# Patient Record
Sex: MALE | Race: WHITE | Employment: FULL TIME | URBAN - METROPOLITAN AREA
[De-identification: names, ages, dates, MRNs, and addresses within clinical notes are randomized per-mention and may not be internally consistent; named-entity substitution may affect disease eponyms.]

---

## 2017-03-09 ENCOUNTER — OFFICE VISIT (OUTPATIENT)
Dept: INTERNAL MEDICINE CLINIC | Facility: CLINIC | Age: 57
End: 2017-03-09

## 2017-03-09 VITALS
HEIGHT: 74 IN | HEART RATE: 72 BPM | SYSTOLIC BLOOD PRESSURE: 148 MMHG | WEIGHT: 247 LBS | RESPIRATION RATE: 20 BRPM | BODY MASS INDEX: 31.7 KG/M2 | TEMPERATURE: 98 F | DIASTOLIC BLOOD PRESSURE: 84 MMHG

## 2017-03-09 DIAGNOSIS — F17.200 TOBACCO USE DISORDER: ICD-10-CM

## 2017-03-09 DIAGNOSIS — I10 ESSENTIAL HYPERTENSION WITH GOAL BLOOD PRESSURE LESS THAN 140/90: Primary | ICD-10-CM

## 2017-03-09 PROCEDURE — 99214 OFFICE O/P EST MOD 30 MIN: CPT | Performed by: INTERNAL MEDICINE

## 2017-03-09 PROCEDURE — 99212 OFFICE O/P EST SF 10 MIN: CPT | Performed by: INTERNAL MEDICINE

## 2017-03-09 RX ORDER — AMLODIPINE BESYLATE 2.5 MG/1
2.5 TABLET ORAL DAILY
Qty: 90 TABLET | Refills: 3 | Status: SHIPPED | OUTPATIENT
Start: 2017-03-09 | End: 2018-01-09

## 2017-03-09 RX ORDER — LOSARTAN POTASSIUM AND HYDROCHLOROTHIAZIDE 25; 100 MG/1; MG/1
1 TABLET ORAL DAILY
Qty: 90 TABLET | Refills: 3 | Status: SHIPPED | OUTPATIENT
Start: 2017-03-09 | End: 2018-04-14

## 2017-03-09 RX ORDER — VARENICLINE TARTRATE 1 MG/1
1 TABLET, FILM COATED ORAL 2 TIMES DAILY
Refills: 6 | COMMUNITY
Start: 2017-02-08 | End: 2017-12-12

## 2017-03-09 NOTE — PROGRESS NOTES
HPI:    Patient ID: Sammy Hammonds is a 64year old male. HPI  Patient is here for follow-up on hypertension. Last seen on December. At that time increase losartan hydrochlorothiazide 100/12.5 up to 100/25.   Also taking Chantix to try to quit smoking HENT: Negative for hearing loss. Eyes: Negative for visual disturbance. Respiratory: Negative for cough and shortness of breath. Cardiovascular: Negative for chest pain and palpitations.    Gastrointestinal: Negative for nausea, vomiting, abdomina Abdominal: Soft. Bowel sounds are normal. He exhibits no mass. There is no tenderness. Musculoskeletal: He exhibits no tenderness. Lymphadenopathy:     He has no cervical adenopathy. Neurological: He is alert. Skin: Skin is warm and dry.  No rash

## 2017-06-06 ENCOUNTER — TELEPHONE (OUTPATIENT)
Dept: INTERNAL MEDICINE CLINIC | Facility: CLINIC | Age: 57
End: 2017-06-06

## 2017-06-10 RX ORDER — CYCLOBENZAPRINE HCL 10 MG
TABLET ORAL
Qty: 30 TABLET | Refills: 1 | Status: SHIPPED | OUTPATIENT
Start: 2017-06-10 | End: 2017-12-12

## 2017-12-01 ENCOUNTER — OFFICE VISIT (OUTPATIENT)
Dept: ORTHOPEDICS CLINIC | Facility: CLINIC | Age: 57
End: 2017-12-01

## 2017-12-01 ENCOUNTER — HOSPITAL ENCOUNTER (OUTPATIENT)
Dept: GENERAL RADIOLOGY | Facility: HOSPITAL | Age: 57
Discharge: HOME | End: 2017-12-01
Attending: ORTHOPAEDIC SURGERY
Payer: COMMERCIAL

## 2017-12-01 DIAGNOSIS — M25.562 ACUTE PAIN OF LEFT KNEE: Primary | ICD-10-CM

## 2017-12-01 DIAGNOSIS — M25.562 LEFT KNEE PAIN, UNSPECIFIED CHRONICITY: ICD-10-CM

## 2017-12-01 DIAGNOSIS — M25.572 ACUTE LEFT ANKLE PAIN: ICD-10-CM

## 2017-12-01 DIAGNOSIS — M51.36 DDD (DEGENERATIVE DISC DISEASE), LUMBAR: ICD-10-CM

## 2017-12-01 PROCEDURE — 99214 OFFICE O/P EST MOD 30 MIN: CPT | Performed by: ORTHOPAEDIC SURGERY

## 2017-12-01 PROCEDURE — 73565 X-RAY EXAM OF KNEES: CPT | Performed by: ORTHOPAEDIC SURGERY

## 2017-12-01 PROCEDURE — 73560 X-RAY EXAM OF KNEE 1 OR 2: CPT | Performed by: ORTHOPAEDIC SURGERY

## 2017-12-01 PROCEDURE — 99212 OFFICE O/P EST SF 10 MIN: CPT | Performed by: ORTHOPAEDIC SURGERY

## 2017-12-01 NOTE — PROGRESS NOTES
12/1/2017  Maribeth Kam  3/22/1960  62year old   male  Brendon Condon MD    HPI:   Patient presents with:  Knee Pain: left- pt states on 11/10 he was walking and his knee gave out and has had pain since.      This is a pleasant 42-year-old male froylan patient has a soft calf. The patient has a negative Homans sign. Patient has range of motion from 0-130°. Patient has no palpable defect along the superior pole of the patella. There is no medial or lateral joint line tenderness to palpation.     The pa

## 2017-12-07 ENCOUNTER — OFFICE VISIT (OUTPATIENT)
Dept: PHYSICAL THERAPY | Age: 57
End: 2017-12-07
Attending: ORTHOPAEDIC SURGERY
Payer: COMMERCIAL

## 2017-12-07 PROCEDURE — 97162 PT EVAL MOD COMPLEX 30 MIN: CPT | Performed by: PHYSICAL THERAPIST

## 2017-12-07 NOTE — PROGRESS NOTES
LOWER EXTREMITY EVALUATION:   Referring Physician: Dr. Blu Gates  Date of Onset: November 10, 2017 Date of Service: 12/7/2017   Diagnosis: Acute onset of L knee pain, DDD lumbar spine  PATIENT SUMMARY:   Olga Zamora is a 62year old y/o male who present heel strike, push off L  Palpation: slight tenderness superior to patella   Sensation: Intact    AROM:  Hip Knee Foot/Ankle   Flexion: R 120; L 110  Extension: R 10; L 10  Abduction: R WNL; L WNL  ER: R WNL; L wnl  IR: R wnl; L  wnl Flexion: 130 5 L 115  E be seen for 2 x/week or a total of 8-12 visits over a 90 day period. Treatment will include:  Therapeutic Exercise and Home Exercise Program instruction    Education or treatment limitation: None  Rehab Potential: good    FOTO:       Patient  was advised of

## 2017-12-11 ENCOUNTER — OFFICE VISIT (OUTPATIENT)
Dept: PHYSICAL THERAPY | Age: 57
End: 2017-12-11
Attending: ORTHOPAEDIC SURGERY
Payer: COMMERCIAL

## 2017-12-11 PROCEDURE — 97110 THERAPEUTIC EXERCISES: CPT

## 2017-12-11 NOTE — PROGRESS NOTES
Dx: acute onset of (L) knee pain , DDD lumbar spine         Authorized # of Visits:  12 per POC         Next MD visit: none scheduled  Fall Risk: standard         Precautions: n/a

## 2017-12-12 ENCOUNTER — OFFICE VISIT (OUTPATIENT)
Dept: NEUROLOGY | Facility: CLINIC | Age: 57
End: 2017-12-12

## 2017-12-12 ENCOUNTER — TELEPHONE (OUTPATIENT)
Dept: NEUROLOGY | Facility: CLINIC | Age: 57
End: 2017-12-12

## 2017-12-12 VITALS
SYSTOLIC BLOOD PRESSURE: 126 MMHG | HEIGHT: 74 IN | DIASTOLIC BLOOD PRESSURE: 80 MMHG | WEIGHT: 250 LBS | BODY MASS INDEX: 32.08 KG/M2 | HEART RATE: 84 BPM | RESPIRATION RATE: 16 BRPM

## 2017-12-12 DIAGNOSIS — R25.1 TREMOR OF BOTH HANDS: ICD-10-CM

## 2017-12-12 DIAGNOSIS — M16.0 BILATERAL PRIMARY OSTEOARTHRITIS OF HIP: ICD-10-CM

## 2017-12-12 DIAGNOSIS — M47.816 LUMBAR FACET ARTHROPATHY: Primary | ICD-10-CM

## 2017-12-12 PROCEDURE — 99204 OFFICE O/P NEW MOD 45 MIN: CPT | Performed by: PHYSICAL MEDICINE & REHABILITATION

## 2017-12-12 NOTE — TELEPHONE ENCOUNTER
[employer] for authorization of approval for MRI L-spine wo. Approval was given with Authorization # [id number] effective 12/12/17 to 01/10/18. Will call Pt. to inform. Pt. informed of approval. Transferred call to scheduling for appt.

## 2017-12-12 NOTE — H&P
No referring provider defined for this encounter.   Orquidea Chandler MD    PHYSICAL MEDICINE and REHABILITATION NEW PATIENT    Chief Complaint: low back pain    HPI: Yulia Zurita is a 62year old male who presents with a chief complaint of Leg Pain (New p Previous treatments:  Physical therapy as above. Has been prescribed medrol dosepak in the past but he is unsure why. He has also taken percocet for post-op pain, but none recently for low back pain.  Has also taken diclofenac in the past.    Current Pain: Omega-3 Fatty Acids ( OMEGA-3 FISH OIL) 1200 MG Oral Capsule Delayed Release Take  by mouth. Disp:  Rfl:    B Complex Vitamins (B-COMPLEX/B-12 TR) Oral Tab CR Take  by mouth.  Disp:  Rfl:        Pollen                  Runny nose    Social History  Social Lumbar range of motion: Pain with terminal extension. No pain with lumbar flexion. Hip range of motion: + back pain with internal rotation of the bilateral hips. No pain with external rotation of the bilateral hip.   Palpation: + pain with palpation of th Imaging: Xray of the L-spine reviewed independently with patient. He has a bilateral L5-S1 lumbar facet hypertrophy, right worse than left. He also has disc space narrowing at L4-L5. X-ray of the bilateral hips is also reviewed.   He has medial joint spa

## 2017-12-14 ENCOUNTER — MED REC SCAN ONLY (OUTPATIENT)
Dept: NEUROLOGY | Facility: CLINIC | Age: 57
End: 2017-12-14

## 2017-12-14 ENCOUNTER — OFFICE VISIT (OUTPATIENT)
Dept: PHYSICAL THERAPY | Age: 57
End: 2017-12-14
Attending: ORTHOPAEDIC SURGERY
Payer: COMMERCIAL

## 2017-12-14 PROCEDURE — 97110 THERAPEUTIC EXERCISES: CPT

## 2017-12-14 NOTE — PROGRESS NOTES
Dx: acute onset of (L) knee pain , DDD lumbar spine         Authorized # of Visits:  12 per POC         Next MD visit: none scheduled  Fall Risk: standard         Precautions: n/a Timed Treatment: 40 min  Total Treatment Time: 45 min

## 2017-12-19 ENCOUNTER — OFFICE VISIT (OUTPATIENT)
Dept: PHYSICAL THERAPY | Age: 57
End: 2017-12-19
Attending: ORTHOPAEDIC SURGERY
Payer: COMMERCIAL

## 2017-12-19 PROCEDURE — 97110 THERAPEUTIC EXERCISES: CPT

## 2017-12-19 NOTE — PROGRESS NOTES
Dx: acute onset of (L) knee pain , DDD lumbar spine         Authorized # of Visits:  12 per POC         Next MD visit: none scheduled  Fall Risk: standard         Precautions: n/a Education done: reviewed current  HEP with patient . ( seated QS x 20 , supine SLR with QS  X 20 , sdly : hip abduction x 20 , prone : hip extension x 20 with 2 lbs to be performed 2 times a day . Plan: continue per POC and advance as able.     Yecenia

## 2017-12-20 ENCOUNTER — HOSPITAL ENCOUNTER (OUTPATIENT)
Dept: MRI IMAGING | Facility: HOSPITAL | Age: 57
Discharge: HOME | End: 2017-12-20
Attending: PHYSICAL MEDICINE & REHABILITATION
Payer: COMMERCIAL

## 2017-12-20 DIAGNOSIS — M47.816 LUMBAR FACET ARTHROPATHY: ICD-10-CM

## 2017-12-20 PROCEDURE — 72148 MRI LUMBAR SPINE W/O DYE: CPT | Performed by: PHYSICAL MEDICINE & REHABILITATION

## 2017-12-20 NOTE — PROGRESS NOTES
Please let the patient know he has a mild aortic aneurysm that he should discuss with his primary care physician. The aneurysm is not particularly large but is something that should be noted and monitored if the PCP thinks this is appropriate.  In terms of

## 2017-12-21 ENCOUNTER — OFFICE VISIT (OUTPATIENT)
Dept: PHYSICAL THERAPY | Age: 57
End: 2017-12-21
Attending: ORTHOPAEDIC SURGERY
Payer: COMMERCIAL

## 2017-12-21 ENCOUNTER — TELEPHONE (OUTPATIENT)
Dept: NEUROLOGY | Facility: CLINIC | Age: 57
End: 2017-12-21

## 2017-12-21 PROCEDURE — 97110 THERAPEUTIC EXERCISES: CPT

## 2017-12-21 NOTE — PROGRESS NOTES
Dx: acute onset of (L) knee pain , DDD lumbar spine         Authorized # of Visits:  12 per POC         Next MD visit: none scheduled  Fall Risk: standard         Precautions: n/a as able.     Charges: ex's 3       Total Timed Treatment: 40 min  Total Treatment Time: 45 min

## 2017-12-21 NOTE — TELEPHONE ENCOUNTER
----- Message from Lula Yip DO sent at 12/20/2017  4:07 PM CST -----  Please let the patient know he has a mild aortic aneurysm that he should discuss with his primary care physician.  The aneurysm is not particularly large but is something that shoul

## 2017-12-21 NOTE — TELEPHONE ENCOUNTER
----- Message from Argentina Bryan DO sent at 12/20/2017  4:07 PM CST -----  Please let the patient know he has a mild aortic aneurysm that he should discuss with his primary care physician.  The aneurysm is not particularly large but is something that shoul

## 2017-12-22 NOTE — TELEPHONE ENCOUNTER
Advised patient of MRI results and Dr Contreras Form recommendations from 12-20. Patient will f/u with his primary regarding the aneurysm and back. Patient was advised to continue with Physical Therapy.

## 2017-12-22 NOTE — TELEPHONE ENCOUNTER
[address] [facility], left voice mail to have patient call today for MRI result. Patient stated he would call yesterday but never called.

## 2017-12-26 ENCOUNTER — APPOINTMENT (OUTPATIENT)
Dept: PHYSICAL THERAPY | Age: 57
End: 2017-12-26
Attending: ORTHOPAEDIC SURGERY
Payer: COMMERCIAL

## 2017-12-28 ENCOUNTER — OFFICE VISIT (OUTPATIENT)
Dept: PHYSICAL THERAPY | Age: 57
End: 2017-12-28
Attending: ORTHOPAEDIC SURGERY
Payer: COMMERCIAL

## 2017-12-28 NOTE — PROGRESS NOTES
Dx: acute onset of (L) knee pain , DDD lumbar spine         Authorized # of Visits:  12 per POC         Next MD visit: none scheduled  Fall Risk: standard         Precautions: n/a min  Total Treatment Time: 0 min

## 2018-01-02 ENCOUNTER — TELEPHONE (OUTPATIENT)
Dept: OTHER | Age: 58
End: 2018-01-02

## 2018-01-02 ENCOUNTER — APPOINTMENT (OUTPATIENT)
Dept: PHYSICAL THERAPY | Age: 58
End: 2018-01-02
Attending: ORTHOPAEDIC SURGERY
Payer: COMMERCIAL

## 2018-01-02 ENCOUNTER — OFFICE VISIT (OUTPATIENT)
Dept: ORTHOPEDICS CLINIC | Facility: CLINIC | Age: 58
End: 2018-01-02

## 2018-01-02 DIAGNOSIS — M51.36 DDD (DEGENERATIVE DISC DISEASE), LUMBAR: ICD-10-CM

## 2018-01-02 DIAGNOSIS — M25.562 ACUTE PAIN OF LEFT KNEE: Primary | ICD-10-CM

## 2018-01-02 PROCEDURE — 99213 OFFICE O/P EST LOW 20 MIN: CPT | Performed by: ORTHOPAEDIC SURGERY

## 2018-01-02 PROCEDURE — 99212 OFFICE O/P EST SF 10 MIN: CPT | Performed by: ORTHOPAEDIC SURGERY

## 2018-01-02 NOTE — TELEPHONE ENCOUNTER
Patient received MRI results from 12/20/17 and was told that he has an aortic aneurysm and to follow up with his PCP as soon as possible to discuss (please see MRI result note 12/20/17)     No availability this week    Please respond to pool: [facility]

## 2018-01-03 NOTE — PROGRESS NOTES
1/2/2018  Sandre Hunt  3/22/1960  62year old   male  Willis Maier MD    HPI:   Patient presents with:  Knee Pain: left- pt states he was told to stop PT due an aneurysm seen on MRI lumbar spine. pt has f/u appt w/ pcp this wk.      This is a ple found on his most recent MRI of the lumbar spine. The patient will be following up with his primary care physician for this issue. Until he has medical clearance, he will hold off on performing physical therapy.   The patient will follow-up after his phys

## 2018-01-04 ENCOUNTER — APPOINTMENT (OUTPATIENT)
Dept: PHYSICAL THERAPY | Age: 58
End: 2018-01-04
Attending: ORTHOPAEDIC SURGERY
Payer: COMMERCIAL

## 2018-01-08 ENCOUNTER — APPOINTMENT (OUTPATIENT)
Dept: PHYSICAL THERAPY | Age: 58
End: 2018-01-08
Attending: ORTHOPAEDIC SURGERY
Payer: COMMERCIAL

## 2018-01-09 ENCOUNTER — OFFICE VISIT (OUTPATIENT)
Dept: INTERNAL MEDICINE CLINIC | Facility: CLINIC | Age: 58
End: 2018-01-09

## 2018-01-09 VITALS
DIASTOLIC BLOOD PRESSURE: 84 MMHG | HEART RATE: 76 BPM | HEIGHT: 74 IN | TEMPERATURE: 98 F | SYSTOLIC BLOOD PRESSURE: 138 MMHG | WEIGHT: 252.81 LBS | BODY MASS INDEX: 32.44 KG/M2 | RESPIRATION RATE: 20 BRPM

## 2018-01-09 DIAGNOSIS — M51.9 LUMBAR DISC DISEASE: ICD-10-CM

## 2018-01-09 DIAGNOSIS — I71.4 ABDOMINAL AORTIC ANEURYSM (AAA) WITHOUT RUPTURE (HCC): ICD-10-CM

## 2018-01-09 DIAGNOSIS — I10 ESSENTIAL HYPERTENSION WITH GOAL BLOOD PRESSURE LESS THAN 140/90: Primary | ICD-10-CM

## 2018-01-09 PROCEDURE — 99212 OFFICE O/P EST SF 10 MIN: CPT | Performed by: INTERNAL MEDICINE

## 2018-01-09 PROCEDURE — 99214 OFFICE O/P EST MOD 30 MIN: CPT | Performed by: INTERNAL MEDICINE

## 2018-01-09 RX ORDER — AMLODIPINE BESYLATE 5 MG/1
5 TABLET ORAL DAILY
Qty: 90 TABLET | Refills: 3 | Status: SHIPPED | OUTPATIENT
Start: 2018-01-09 | End: 2018-09-11

## 2018-01-09 NOTE — PROGRESS NOTES
HPI:    Patient ID: Jimmye Homans is a 62year old male. HPI  Patient is here for follow-up on hypertension as well as lower back issues. Last seen here on March 9. At that time we added amlodipine 2.5 mg a day.   Continued other blood pressure medic Years: 40.00        Types: Cigarettes  Smokeless tobacco: Never Used                      Comment: taking chantix  Alcohol use: Yes           3.6 oz/week     Cans of beer: 6 per week     Comment: beer - socially (1 drink) only weekend         Review of Sys Cardiovascular: Normal rate, regular rhythm, normal heart sounds and intact distal pulses. Exam reveals no gallop and no friction rub. No murmur heard. Edema not present. Pulmonary/Chest: Effort normal and breath sounds normal. He has no wheezes.

## 2018-01-11 ENCOUNTER — APPOINTMENT (OUTPATIENT)
Dept: PHYSICAL THERAPY | Age: 58
End: 2018-01-11
Attending: ORTHOPAEDIC SURGERY
Payer: COMMERCIAL

## 2018-01-26 ENCOUNTER — TELEPHONE (OUTPATIENT)
Dept: NEUROLOGY | Facility: CLINIC | Age: 58
End: 2018-01-26

## 2018-01-26 ENCOUNTER — OFFICE VISIT (OUTPATIENT)
Dept: NEUROLOGY | Facility: CLINIC | Age: 58
End: 2018-01-26

## 2018-01-26 VITALS
HEIGHT: 74 IN | BODY MASS INDEX: 32.73 KG/M2 | SYSTOLIC BLOOD PRESSURE: 142 MMHG | HEART RATE: 80 BPM | DIASTOLIC BLOOD PRESSURE: 82 MMHG | RESPIRATION RATE: 16 BRPM | WEIGHT: 255 LBS

## 2018-01-26 DIAGNOSIS — M48.061 SPINAL STENOSIS OF LUMBAR REGION WITHOUT NEUROGENIC CLAUDICATION: ICD-10-CM

## 2018-01-26 DIAGNOSIS — M16.0 BILATERAL PRIMARY OSTEOARTHRITIS OF HIP: ICD-10-CM

## 2018-01-26 DIAGNOSIS — M47.816 LUMBAR FACET ARTHROPATHY: Primary | ICD-10-CM

## 2018-01-26 PROCEDURE — 99213 OFFICE O/P EST LOW 20 MIN: CPT | Performed by: PHYSICAL MEDICINE & REHABILITATION

## 2018-01-26 NOTE — TELEPHONE ENCOUNTER
Patient has been scheduled for a L4-5 interlaminar epidural steroid injection, midline, under fluoroscopy, local on 2/14/18 at the [facility]. Medications and allergies reviewed.  Patient informed to hold aspirins, nsaids, blood thinners, vitamins and fish oils 7

## 2018-01-26 NOTE — TELEPHONE ENCOUNTER
Called [facility] at [phone] for authorization of approval for Lr-5 interlaminar epidural steroid injection, midline, under fluoroscopy , CPT code 41987. T/Hyun POLANCO who states no authorization is required. Reference #[id number].   Will inform nursing to leah

## 2018-01-26 NOTE — PROGRESS NOTES
HPI:    Patient ID: Dee Diaz is a 62year old male. This is a 63-year-old male who returns for follow-up to discuss results of lumbar MRI.   It did note an incidental 3.8 x 3.4 infrarenal aortic mass which is being followed by his primary care phy Normocephalic and atraumatic. Eyes: Conjunctivae and EOM are normal.   Musculoskeletal:   Lumbar range of motion: Positive pain with lumbar flexion. Less pain with lumbar extension.     Palpation: Tenderness to palpation at the bilateral L5 paravertebral DO  Physical Medicine and [address] [place] [address]

## 2018-01-26 NOTE — PATIENT INSTRUCTIONS
As of October 6th 2014, the Drug Enforcement Agency [facility]) is reclassifying all hydrocodone combination medications from Schedule III to Schedule II. This includes medications such as Norco, Vicodin, Lortab, Zohydro, and Vicoprofen.     What this means for y

## 2018-02-14 ENCOUNTER — OFFICE VISIT (OUTPATIENT)
Dept: SURGERY | Facility: CLINIC | Age: 58
End: 2018-02-14

## 2018-02-14 DIAGNOSIS — M48.061 SPINAL STENOSIS OF LUMBAR REGION WITHOUT NEUROGENIC CLAUDICATION: Primary | ICD-10-CM

## 2018-02-14 PROCEDURE — 62323 NJX INTERLAMINAR LMBR/SAC: CPT | Performed by: PHYSICAL MEDICINE & REHABILITATION

## 2018-02-14 NOTE — PROCEDURES
Procedure Note                Informed Consent Obtained by:  Griselda Thomson DO  Procedure performed by: Griselda Thomson DO    Procedure: LUMBAR EPIDURAL STEROID INJECTION UNDER FLUOROSCOPY OF L4-5 midline    Pre-operative Diagnosis: Lumbar Radiculitis, lumbar Patient may continue taking his/her medication as prescribed by the patient’s physician.   4) F/U in 2-3 weeks for post-procedure evaluation    Carlitos Mcmahan DO  Physical Medicine and [address] [place] [address]

## 2018-03-19 ENCOUNTER — CHARTING TRANS (OUTPATIENT)
Dept: OTHER | Age: 58
End: 2018-03-19

## 2018-04-05 ENCOUNTER — OFFICE VISIT (OUTPATIENT)
Dept: NEUROLOGY | Facility: CLINIC | Age: 58
End: 2018-04-05

## 2018-04-05 VITALS
RESPIRATION RATE: 16 BRPM | SYSTOLIC BLOOD PRESSURE: 136 MMHG | HEIGHT: 74 IN | BODY MASS INDEX: 32.73 KG/M2 | WEIGHT: 255 LBS | DIASTOLIC BLOOD PRESSURE: 84 MMHG | HEART RATE: 68 BPM

## 2018-04-05 DIAGNOSIS — M48.061 LUMBAR STENOSIS WITHOUT NEUROGENIC CLAUDICATION: Primary | ICD-10-CM

## 2018-04-05 DIAGNOSIS — M48.061 SPINAL STENOSIS OF LUMBAR REGION WITHOUT NEUROGENIC CLAUDICATION: ICD-10-CM

## 2018-04-05 DIAGNOSIS — M47.816 LUMBAR FACET ARTHROPATHY: ICD-10-CM

## 2018-04-05 PROCEDURE — 99213 OFFICE O/P EST LOW 20 MIN: CPT | Performed by: PHYSICAL MEDICINE & REHABILITATION

## 2018-04-05 NOTE — PATIENT INSTRUCTIONS
May continue low dose aspirin for procedure. As of October 6th 2014, the Drug Enforcement Agency [facility]) is reclassifying all hydrocodone combination medications from Schedule III to Schedule II.  This includes medications such as Norco, Vicodin, Lortab, prescription must be documented in your chart.

## 2018-04-05 NOTE — PROGRESS NOTES
HPI:    Patient ID: Kristine Cole is a 62year old male. Since a 26-year-old male who presents for follow-up. I performed a L4-L5 interlaminar lumbar epidural steroid injection on 2/14/2018. States that the injection gave him about 20% relief.   Ryan Danielle tests: Negative supine SLR bilaterally. Negative seated slump test. Decreased flexibility of the left hamstrings. Neurological:   Strength: Decreased muscle bulk in the left thigh. 5/5 LE strength b/l.   Sensation: intact to light touch bilaterally   Nurs

## 2018-04-09 ENCOUNTER — TELEPHONE (OUTPATIENT)
Dept: NEUROLOGY | Facility: CLINIC | Age: 58
End: 2018-04-09

## 2018-04-09 NOTE — TELEPHONE ENCOUNTER
LMTCB. Scheduled patient for bilateral L4-5 transforaminal epidural steroid injection under fluoroscopy on 5/7/18 per Dr. Blanca Yousif note. Asked him to call the office to confirm date and review pre-procedure injections.

## 2018-04-09 NOTE — TELEPHONE ENCOUNTER
Called [facility] BS for authorization of approval of bilateral L4-5 TFESIs cpt codes [id number], [id number]. Talked to   Senait. who states no authorization is required. Reference # [id number]. Will inform Nursing.

## 2018-04-11 ENCOUNTER — HOSPITAL ENCOUNTER (OUTPATIENT)
Dept: ULTRASOUND IMAGING | Age: 58
Discharge: HOME | End: 2018-04-11
Attending: INTERNAL MEDICINE
Payer: COMMERCIAL

## 2018-04-11 DIAGNOSIS — I71.4 ABDOMINAL AORTIC ANEURYSM (AAA) WITHOUT RUPTURE (HCC): ICD-10-CM

## 2018-04-11 PROCEDURE — 76770 US EXAM ABDO BACK WALL COMP: CPT | Performed by: INTERNAL MEDICINE

## 2018-04-16 RX ORDER — LOSARTAN POTASSIUM AND HYDROCHLOROTHIAZIDE 25; 100 MG/1; MG/1
TABLET ORAL
Qty: 90 TABLET | Refills: 1 | Status: SHIPPED | OUTPATIENT
Start: 2018-04-16 | End: 2018-11-08

## 2018-04-16 NOTE — TELEPHONE ENCOUNTER
Hypertensive Medications  Protocol Criteria:  · Appointment scheduled in the past 6 months or in the next 3 months  · BMP or CMP in the past 12 months  · Creatinine result < 2  Recent Outpatient Visits            1 week ago Lumbar stenosis without neurogen

## 2018-05-03 NOTE — TELEPHONE ENCOUNTER
Called patient, confirmed he is doing the injection 5/7/18, stated he already stopped medications he was instructed to stop. Faxed form to [facility].      Patient has been scheduled for a Bilateral L4-5 transforaminal epidural steroid injection under fluoroscopy

## 2018-05-03 NOTE — TELEPHONE ENCOUNTER
Sandor Purcell from [facility] called to confirm patients for 5/7/18, called patient to confirm if he is still having the injection.

## 2018-05-07 ENCOUNTER — OFFICE VISIT (OUTPATIENT)
Dept: SURGERY | Facility: CLINIC | Age: 58
End: 2018-05-07

## 2018-05-07 DIAGNOSIS — M48.061 SPINAL STENOSIS OF LUMBAR REGION WITHOUT NEUROGENIC CLAUDICATION: Primary | ICD-10-CM

## 2018-05-07 PROCEDURE — 64483 NJX AA&/STRD TFRM EPI L/S 1: CPT | Performed by: PHYSICAL MEDICINE & REHABILITATION

## 2018-05-07 NOTE — PROCEDURES
[address]    BILATERAL LUMBAR TRANSFORAMINAL   NAME:  Kristine Cole    MR #:    [id number] :  3/22/1960     PHYSICIAN:  Maria Dolores Abraham        Operative Report    DATE OF PROCEDURE: 2018   PREOPERATIVE DIAGNOSES: 1.  L3-4 jenae of 1% PF lidocaine without epinephrine. Then, a 5 inch, 22-gauge spinal needle was inserted and directed towards the left L4-5 intervertebral foramen.   When it felt to be in good position, AP fluoroscopy was used to advance the needle to the 6 o'clock pos

## 2018-07-16 ENCOUNTER — OFFICE VISIT (OUTPATIENT)
Dept: NEUROLOGY | Facility: CLINIC | Age: 58
End: 2018-07-16

## 2018-07-16 VITALS
HEART RATE: 72 BPM | DIASTOLIC BLOOD PRESSURE: 78 MMHG | BODY MASS INDEX: 32.34 KG/M2 | HEIGHT: 74 IN | SYSTOLIC BLOOD PRESSURE: 140 MMHG | WEIGHT: 252 LBS

## 2018-07-16 DIAGNOSIS — M48.061 SPINAL STENOSIS OF LUMBAR REGION WITHOUT NEUROGENIC CLAUDICATION: Primary | ICD-10-CM

## 2018-07-16 DIAGNOSIS — M16.0 BILATERAL PRIMARY OSTEOARTHRITIS OF HIP: ICD-10-CM

## 2018-07-16 DIAGNOSIS — M47.816 LUMBAR FACET ARTHROPATHY: ICD-10-CM

## 2018-07-16 PROCEDURE — 99213 OFFICE O/P EST LOW 20 MIN: CPT | Performed by: PHYSICAL MEDICINE & REHABILITATION

## 2018-07-16 NOTE — PROGRESS NOTES
HPI:    Patient ID: Dee Diaz is a 62year old male. He is here for follow-up of bilateral axial low back pain. He has undergone the following procedures with me:    1.   L4-L5 interlaminar epidural steroid injection under local anesthesia, 2/14 Tab CR Take  by mouth. Disp:  Rfl:      Allergies:  Pollen                  Runny nose   PHYSICAL EXAM:   Physical Exam   Constitutional: He appears well-developed and well-nourished. HENT:   Head: Normocephalic and atraumatic.    Eyes: Conjunctivae and E

## 2018-08-23 ENCOUNTER — CHARTING TRANS (OUTPATIENT)
Dept: OTHER | Age: 58
End: 2018-08-23

## 2018-09-11 ENCOUNTER — OFFICE VISIT (OUTPATIENT)
Dept: INTERNAL MEDICINE CLINIC | Facility: CLINIC | Age: 58
End: 2018-09-11
Payer: COMMERCIAL

## 2018-09-11 VITALS
RESPIRATION RATE: 20 BRPM | SYSTOLIC BLOOD PRESSURE: 138 MMHG | HEART RATE: 68 BPM | WEIGHT: 253.81 LBS | BODY MASS INDEX: 32.57 KG/M2 | HEIGHT: 74 IN | TEMPERATURE: 98 F | DIASTOLIC BLOOD PRESSURE: 86 MMHG

## 2018-09-11 DIAGNOSIS — J06.9 UPPER RESPIRATORY TRACT INFECTION, UNSPECIFIED TYPE: ICD-10-CM

## 2018-09-11 DIAGNOSIS — Z12.11 COLON CANCER SCREENING: ICD-10-CM

## 2018-09-11 DIAGNOSIS — F17.200 TOBACCO USE DISORDER: ICD-10-CM

## 2018-09-11 DIAGNOSIS — Z00.00 ROUTINE PHYSICAL EXAMINATION: Primary | ICD-10-CM

## 2018-09-11 DIAGNOSIS — M51.9 LUMBAR DISC DISEASE: ICD-10-CM

## 2018-09-11 DIAGNOSIS — I10 ESSENTIAL HYPERTENSION WITH GOAL BLOOD PRESSURE LESS THAN 140/90: ICD-10-CM

## 2018-09-11 DIAGNOSIS — I71.4 ABDOMINAL AORTIC ANEURYSM (AAA) WITHOUT RUPTURE (HCC): ICD-10-CM

## 2018-09-11 PROCEDURE — 99396 PREV VISIT EST AGE 40-64: CPT | Performed by: INTERNAL MEDICINE

## 2018-09-11 RX ORDER — AMLODIPINE BESYLATE 5 MG/1
5 TABLET ORAL DAILY
Qty: 90 TABLET | Refills: 3 | Status: SHIPPED | OUTPATIENT
Start: 2018-09-11 | End: 2019-06-25

## 2018-09-11 NOTE — PROGRESS NOTES
HPI:    Patient ID: Nora Johnson is a 62year old male. HPI  Patient is here requesting a physical exam.  I last saw him in March 2017. Since that time he has had multiple visits with orthopedics, physiatry, and physical therapy.   This is generally allergies   • H/O blood clots    • L3-4 central and bilateral foraminal stenosis 1/26/2018   • Lumbar facet arthropathy ([facility] [address] 12/12/2017      Past Surgical History:  2013: KNEE ARTHROSCOPY;  Right      Comment:  partial lateral meniscectomy  04/06/2015: MANDY Take  by mouth. Disp:  Rfl:    Multiple Vitamin (MULTI-VITAMINS) Oral Tab Take  by mouth. Disp:  Rfl:    Omega-3 Fatty Acids (KP OMEGA-3 FISH OIL) 1200 MG Oral Capsule Delayed Release Take  by mouth.  Disp:  Rfl:    B Complex Vitamins (B-COMPLEX/B-12 TR) Or REFLEX TO FREE T4, PSA SCREEN         Physical exam is unremarkable. Active issues as below. Health maintenance issues reviewed. We will check fasting blood work and contact patient with results. Encouraged diet and exercise as tolerated.   Try to lose

## 2018-10-27 ENCOUNTER — LAB ENCOUNTER (OUTPATIENT)
Dept: LAB | Age: 58
End: 2018-10-27
Attending: INTERNAL MEDICINE
Payer: COMMERCIAL

## 2018-10-27 DIAGNOSIS — Z00.00 ROUTINE PHYSICAL EXAMINATION: ICD-10-CM

## 2018-10-27 PROCEDURE — 80061 LIPID PANEL: CPT

## 2018-10-27 PROCEDURE — 82607 VITAMIN B-12: CPT

## 2018-10-27 PROCEDURE — 84439 ASSAY OF FREE THYROXINE: CPT

## 2018-10-27 PROCEDURE — 84443 ASSAY THYROID STIM HORMONE: CPT

## 2018-10-27 PROCEDURE — 85025 COMPLETE CBC W/AUTO DIFF WBC: CPT

## 2018-10-27 PROCEDURE — 36415 COLL VENOUS BLD VENIPUNCTURE: CPT

## 2018-10-27 PROCEDURE — 80053 COMPREHEN METABOLIC PANEL: CPT

## 2018-11-01 VITALS
OXYGEN SATURATION: 98 % | HEIGHT: 74 IN | BODY MASS INDEX: 32.73 KG/M2 | TEMPERATURE: 98.6 F | HEART RATE: 80 BPM | RESPIRATION RATE: 16 BRPM | WEIGHT: 255 LBS

## 2018-11-08 RX ORDER — LOSARTAN POTASSIUM AND HYDROCHLOROTHIAZIDE 25; 100 MG/1; MG/1
TABLET ORAL
Qty: 90 TABLET | Refills: 0 | Status: SHIPPED | OUTPATIENT
Start: 2018-11-08 | End: 2019-03-01

## 2018-11-27 VITALS
BODY MASS INDEX: 32.73 KG/M2 | HEIGHT: 74 IN | OXYGEN SATURATION: 98 % | WEIGHT: 255 LBS | RESPIRATION RATE: 20 BRPM | HEART RATE: 84 BPM | TEMPERATURE: 98.5 F

## 2018-12-12 ENCOUNTER — TELEPHONE (OUTPATIENT)
Dept: SCHEDULING | Age: 58
End: 2018-12-12

## 2018-12-12 ENCOUNTER — WALK IN (OUTPATIENT)
Dept: URGENT CARE | Age: 58
End: 2018-12-12

## 2018-12-12 VITALS
OXYGEN SATURATION: 98 % | SYSTOLIC BLOOD PRESSURE: 148 MMHG | RESPIRATION RATE: 16 BRPM | TEMPERATURE: 98.6 F | HEART RATE: 72 BPM | DIASTOLIC BLOOD PRESSURE: 88 MMHG

## 2018-12-12 DIAGNOSIS — J01.00 ACUTE NON-RECURRENT MAXILLARY SINUSITIS: Primary | ICD-10-CM

## 2018-12-12 DIAGNOSIS — I10 ESSENTIAL HYPERTENSION: ICD-10-CM

## 2018-12-12 PROCEDURE — 3079F DIAST BP 80-89 MM HG: CPT | Performed by: NURSE PRACTITIONER

## 2018-12-12 PROCEDURE — 99203 OFFICE O/P NEW LOW 30 MIN: CPT | Performed by: NURSE PRACTITIONER

## 2018-12-12 PROCEDURE — 3077F SYST BP >= 140 MM HG: CPT | Performed by: NURSE PRACTITIONER

## 2018-12-12 RX ORDER — AMLODIPINE BESYLATE 5 MG/1
5 TABLET ORAL DAILY
COMMUNITY

## 2018-12-12 RX ORDER — AMOXICILLIN AND CLAVULANATE POTASSIUM 875; 125 MG/1; MG/1
1 TABLET, FILM COATED ORAL EVERY 12 HOURS
Qty: 20 TABLET | Refills: 0 | Status: SHIPPED | OUTPATIENT
Start: 2018-12-12 | End: 2018-12-22

## 2018-12-12 RX ORDER — LOSARTAN POTASSIUM 25 MG/1
25 TABLET ORAL DAILY
COMMUNITY

## 2018-12-12 ASSESSMENT — ENCOUNTER SYMPTOMS
NEUROLOGICAL NEGATIVE: 1
ALLERGIC/IMMUNOLOGIC NEGATIVE: 1
WHEEZING: 0
COUGH: 1
SORE THROAT: 1
CHOKING: 0
FATIGUE: 1
GASTROINTESTINAL NEGATIVE: 1
FEVER: 0
PSYCHIATRIC NEGATIVE: 1
EYES NEGATIVE: 1
SHORTNESS OF BREATH: 0
SINUS PRESSURE: 1
APPETITE CHANGE: 0
CHEST TIGHTNESS: 0

## 2019-03-01 RX ORDER — LOSARTAN POTASSIUM AND HYDROCHLOROTHIAZIDE 25; 100 MG/1; MG/1
TABLET ORAL
Qty: 90 TABLET | Refills: 0 | Status: SHIPPED | OUTPATIENT
Start: 2019-03-01 | End: 2019-06-25

## 2019-04-25 ENCOUNTER — TELEPHONE (OUTPATIENT)
Dept: INTERNAL MEDICINE CLINIC | Facility: CLINIC | Age: 59
End: 2019-04-25

## 2019-04-25 DIAGNOSIS — I71.4 ABDOMINAL AORTIC ANEURYSM (AAA) WITHOUT RUPTURE (HCC): Primary | ICD-10-CM

## 2019-04-26 NOTE — TELEPHONE ENCOUNTER
Left message for patient to call office back, please transfer call to ext. [phone] when he calls office back.

## 2019-05-18 ENCOUNTER — HOSPITAL ENCOUNTER (OUTPATIENT)
Dept: ULTRASOUND IMAGING | Facility: HOSPITAL | Age: 59
Discharge: HOME | End: 2019-05-18
Attending: INTERNAL MEDICINE
Payer: COMMERCIAL

## 2019-05-18 DIAGNOSIS — I71.4 ABDOMINAL AORTIC ANEURYSM (AAA) WITHOUT RUPTURE (HCC): ICD-10-CM

## 2019-05-18 PROCEDURE — 76770 US EXAM ABDO BACK WALL COMP: CPT | Performed by: INTERNAL MEDICINE

## 2019-06-27 RX ORDER — LOSARTAN POTASSIUM AND HYDROCHLOROTHIAZIDE 25; 100 MG/1; MG/1
TABLET ORAL
Qty: 90 TABLET | Refills: 0 | Status: SHIPPED | OUTPATIENT
Start: 2019-06-27 | End: 2019-10-25

## 2019-06-27 RX ORDER — AMLODIPINE BESYLATE 5 MG/1
TABLET ORAL
Qty: 90 TABLET | Refills: 0 | Status: SHIPPED | OUTPATIENT
Start: 2019-06-27 | End: 2021-05-12

## 2019-10-29 NOTE — TELEPHONE ENCOUNTER
Please review; protocol failed.   Last office visit on 9/11/18    Hypertensive Medications  Protocol Criteria:  · Appointment scheduled in the past 6 months or in the next 3 months  · BMP or CMP in the past 12 months  · Creatinine result < 2  Recent Outpati

## 2019-10-30 RX ORDER — LOSARTAN POTASSIUM AND HYDROCHLOROTHIAZIDE 25; 100 MG/1; MG/1
TABLET ORAL
Qty: 90 TABLET | Refills: 1 | Status: SHIPPED | OUTPATIENT
Start: 2019-10-30 | End: 2020-06-12

## 2019-11-04 ENCOUNTER — OFFICE VISIT (OUTPATIENT)
Dept: INTERNAL MEDICINE CLINIC | Facility: CLINIC | Age: 59
End: 2019-11-04
Payer: COMMERCIAL

## 2019-11-04 VITALS
WEIGHT: 240 LBS | RESPIRATION RATE: 20 BRPM | SYSTOLIC BLOOD PRESSURE: 136 MMHG | HEIGHT: 74 IN | HEART RATE: 72 BPM | TEMPERATURE: 98 F | DIASTOLIC BLOOD PRESSURE: 82 MMHG | BODY MASS INDEX: 30.8 KG/M2

## 2019-11-04 DIAGNOSIS — Z12.11 COLON CANCER SCREENING: ICD-10-CM

## 2019-11-04 DIAGNOSIS — I71.4 ABDOMINAL AORTIC ANEURYSM (AAA) WITHOUT RUPTURE (HCC): ICD-10-CM

## 2019-11-04 DIAGNOSIS — M51.9 LUMBAR DISC DISEASE: ICD-10-CM

## 2019-11-04 DIAGNOSIS — Z00.00 ROUTINE PHYSICAL EXAMINATION: Primary | ICD-10-CM

## 2019-11-04 DIAGNOSIS — I10 ESSENTIAL HYPERTENSION WITH GOAL BLOOD PRESSURE LESS THAN 140/90: ICD-10-CM

## 2019-11-04 DIAGNOSIS — F17.200 TOBACCO USE DISORDER: ICD-10-CM

## 2019-11-04 PROCEDURE — 99396 PREV VISIT EST AGE 40-64: CPT | Performed by: INTERNAL MEDICINE

## 2019-11-04 RX ORDER — LOSARTAN POTASSIUM 25 MG/1
25 TABLET ORAL
COMMUNITY
End: 2019-11-04

## 2019-11-04 RX ORDER — MELOXICAM 15 MG/1
15 TABLET ORAL DAILY
Qty: 30 TABLET | Refills: 5 | Status: SHIPPED | OUTPATIENT
Start: 2019-11-04 | End: 2021-04-04

## 2019-11-04 RX ORDER — COMPRESS.STOCKING,KNEE,REG,LRG
EACH MISCELLANEOUS
Refills: 99 | COMMUNITY
Start: 2019-10-31

## 2019-11-04 RX ORDER — TRAMADOL HYDROCHLORIDE 50 MG/1
50 TABLET ORAL
COMMUNITY
Start: 2019-03-06 | End: 2019-11-04 | Stop reason: ALTCHOICE

## 2019-11-04 NOTE — PROGRESS NOTES
HPI:    Patient ID: Gianni Braga is a 61year old male. HPI  Patient is here requesting physical exam and follow-up on chronic medical issues as listed below. Last seen here September of last year.   Full blood work at that time showed worsening chol 12/8/2016   • Extrinsic asthma, unspecified     triggered by enviromental allergies   • H/O blood clots    • L3-4 central and bilateral foraminal stenosis 1/26/2018   • Lumbar facet arthropathy 12/12/2017      Past Surgical History:   Procedure Laterality MOUTH DAILY 90 tablet 1   • AMLODIPINE BESYLATE 5 MG Oral Tab TAKE 1 TABLET BY MOUTH DAILY 90 tablet 0   • Ascorbic Acid (VITAMIN C) 250 MG Oral Tab Take 250 mg by mouth daily. • Misc Natural Products (GLUCOSAMINE CHOND COMPLEX/MSM OR) Take  by mouth. cranial nerve deficit. Coordination normal.   Skin: Skin is warm and dry. No rash noted. Psychiatric: He has a normal mood and affect.  His behavior is normal. Judgment and thought content normal.       Wt Readings from Last 6 Encounters:  11/04/19 : 240 Geena Rivas MD

## 2019-11-09 ENCOUNTER — LAB ENCOUNTER (OUTPATIENT)
Dept: LAB | Age: 59
End: 2019-11-09
Attending: INTERNAL MEDICINE
Payer: COMMERCIAL

## 2019-11-09 DIAGNOSIS — Z00.00 ROUTINE PHYSICAL EXAMINATION: ICD-10-CM

## 2019-11-09 PROCEDURE — 80053 COMPREHEN METABOLIC PANEL: CPT

## 2019-11-09 PROCEDURE — 36415 COLL VENOUS BLD VENIPUNCTURE: CPT

## 2019-11-09 PROCEDURE — 80061 LIPID PANEL: CPT

## 2019-11-09 PROCEDURE — 85025 COMPLETE CBC W/AUTO DIFF WBC: CPT

## 2019-11-09 PROCEDURE — 84439 ASSAY OF FREE THYROXINE: CPT

## 2019-11-09 PROCEDURE — 84443 ASSAY THYROID STIM HORMONE: CPT

## 2019-11-10 NOTE — PROGRESS NOTES
Please send letter    The blood cell count is normal. There is no evidence of anemia or infection. The blood sugar (glucose) level is normal. There is no evidence of diabetes.     The electrolytes levels in the blood are normal.    The kidney and liver f

## 2020-02-13 ENCOUNTER — TELEPHONE (OUTPATIENT)
Dept: INTERNAL MEDICINE CLINIC | Facility: CLINIC | Age: 60
End: 2020-02-13

## 2020-02-13 NOTE — TELEPHONE ENCOUNTER
Pharmacy is calling because the combination pill for   LOSARTAN POTASSIUM-HCTZ 100-25 MG Oral Tab 90        Is not available, they would like to separate the pills and have two new scripts sent in for losartan and HCTZ

## 2020-02-14 NOTE — TELEPHONE ENCOUNTER
Confirmed with pharmacy [facility] that they do have Losartan Potassium-HCTZ 100-25 mg available and this combination medication will be re-ordered for tomorrow does not need to be .

## 2020-03-23 ENCOUNTER — TELEPHONE (OUTPATIENT)
Dept: INTERNAL MEDICINE CLINIC | Facility: CLINIC | Age: 60
End: 2020-03-23

## 2020-03-23 RX ORDER — AMLODIPINE BESYLATE 5 MG/1
5 TABLET ORAL DAILY
Qty: 90 TABLET | Refills: 5 | Status: SHIPPED | OUTPATIENT
Start: 2020-03-23 | End: 2020-12-11

## 2020-03-23 NOTE — TELEPHONE ENCOUNTER
Current Outpatient Medications   Medication Sig Dispense Refill   • AMLODIPINE BESYLATE 5 MG Oral Tab TAKE 1 TABLET BY MOUTH DAILY 90 tablet 0

## 2020-06-12 RX ORDER — LOSARTAN POTASSIUM AND HYDROCHLOROTHIAZIDE 25; 100 MG/1; MG/1
TABLET ORAL
Qty: 90 TABLET | Refills: 1 | Status: SHIPPED | OUTPATIENT
Start: 2020-06-12 | End: 2020-09-25

## 2020-09-25 RX ORDER — LOSARTAN POTASSIUM AND HYDROCHLOROTHIAZIDE 25; 100 MG/1; MG/1
TABLET ORAL
Qty: 90 TABLET | Refills: 0 | Status: SHIPPED | OUTPATIENT
Start: 2020-09-25 | End: 2021-04-04

## 2020-09-29 NOTE — TELEPHONE ENCOUNTER
Patient calling back, informed Dr Latia Puga  note ,states that as of this moment he cannot make follow up appointment as he had an accident last December=a  fall resulting to jaw fracture and with ongoing treatment with the dentist and dental surgeon,states that he accumulated thousand of bills now and been off from work. Dr Minnie Miller above, FYI. Note     Refill x 1 only. Call pt. Needs appt soon.

## 2020-12-11 ENCOUNTER — OFFICE VISIT (OUTPATIENT)
Dept: INTERNAL MEDICINE CLINIC | Facility: CLINIC | Age: 60
End: 2020-12-11
Payer: COMMERCIAL

## 2020-12-11 VITALS
HEIGHT: 74 IN | WEIGHT: 242.31 LBS | SYSTOLIC BLOOD PRESSURE: 142 MMHG | DIASTOLIC BLOOD PRESSURE: 76 MMHG | BODY MASS INDEX: 31.1 KG/M2 | HEART RATE: 69 BPM

## 2020-12-11 DIAGNOSIS — I71.4 ABDOMINAL AORTIC ANEURYSM (AAA) WITHOUT RUPTURE (HCC): ICD-10-CM

## 2020-12-11 DIAGNOSIS — M51.9 LUMBAR DISC DISEASE: Primary | ICD-10-CM

## 2020-12-11 DIAGNOSIS — I10 ESSENTIAL HYPERTENSION WITH GOAL BLOOD PRESSURE LESS THAN 140/90: ICD-10-CM

## 2020-12-11 PROCEDURE — 3077F SYST BP >= 140 MM HG: CPT | Performed by: INTERNAL MEDICINE

## 2020-12-11 PROCEDURE — 3078F DIAST BP <80 MM HG: CPT | Performed by: INTERNAL MEDICINE

## 2020-12-11 PROCEDURE — 99214 OFFICE O/P EST MOD 30 MIN: CPT | Performed by: INTERNAL MEDICINE

## 2020-12-11 PROCEDURE — 3008F BODY MASS INDEX DOCD: CPT | Performed by: INTERNAL MEDICINE

## 2020-12-11 NOTE — PROGRESS NOTES
HPI:    Patient ID: Rossana Sheldon is a 61year old male. HPI  Patient is here with ongoing low back pain. Also follow-up on some chronic issues. Last seen here November 2019 for physical exam.  Still was having back pain at that time.   I have copied Essential hypertension with goal blood pressure less than 140/90     Lumbar facet arthropathy     Bilateral primary osteoarthritis of hip     L3-4 central and bilateral foraminal stenosis         HISTORY:  Past Medical History:   Diagnosis Date   • Bilater DIRECTED  99   • Meloxicam 15 MG Oral Tab Take 1 tablet (15 mg total) by mouth daily. 30 tablet 5   • AMLODIPINE BESYLATE 5 MG Oral Tab TAKE 1 TABLET BY MOUTH DAILY 90 tablet 0   • Ascorbic Acid (VITAMIN C) 250 MG Oral Tab Take 250 mg by mouth daily.      • ongoing low back pain. MRI done a few years ago demonstrated lumbar spinal stenosis. 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 He wonders about disability.   First we will refer

## 2020-12-16 ENCOUNTER — TELEPHONE (OUTPATIENT)
Dept: NEUROLOGY | Facility: CLINIC | Age: 60
End: 2020-12-16

## 2020-12-16 ENCOUNTER — OFFICE VISIT (OUTPATIENT)
Dept: NEUROLOGY | Facility: CLINIC | Age: 60
End: 2020-12-16
Payer: COMMERCIAL

## 2020-12-16 VITALS — BODY MASS INDEX: 31.44 KG/M2 | HEIGHT: 74 IN | WEIGHT: 245 LBS

## 2020-12-16 DIAGNOSIS — M48.061 SPINAL STENOSIS OF LUMBAR REGION WITHOUT NEUROGENIC CLAUDICATION: Primary | ICD-10-CM

## 2020-12-16 PROCEDURE — 99214 OFFICE O/P EST MOD 30 MIN: CPT | Performed by: PHYSICAL MEDICINE & REHABILITATION

## 2020-12-16 PROCEDURE — 3008F BODY MASS INDEX DOCD: CPT | Performed by: PHYSICAL MEDICINE & REHABILITATION

## 2020-12-16 NOTE — PROGRESS NOTES
HPI:    Patient ID: Olga Zamora is a 61year old male. 24-year-old male presents for follow-up. He has been having worsening midline lower back pain at and above the waistline that worsens with standing, forward bending.   Is also been having more f admits  Painful Joints: admits   Neurological  Loss of Strength Since last Visit: denies  Tingling/Numbness: denies        Current Outpatient Medications   Medication Sig Dispense Refill   • GLUCOSAMINE CHONDROITIN COMPLX OR Take 750 mg by mouth.      • LOS for pathology that might be causing the left leg to buckle.     Yumiko Lay DO  Physical Medicine and [address]

## 2020-12-16 NOTE — PATIENT INSTRUCTIONS
If Doctor has ordered an injection or MRI and if you do not hear from us within 3 business days please call the office or send a message through [address] and ask if the injection/MRI has been approved.

## 2020-12-18 ENCOUNTER — MED REC SCAN ONLY (OUTPATIENT)
Dept: NEUROLOGY | Facility: CLINIC | Age: 60
End: 2020-12-18

## 2021-01-09 ENCOUNTER — HOSPITAL ENCOUNTER (OUTPATIENT)
Dept: MRI IMAGING | Age: 61
Discharge: HOME | End: 2021-01-09
Attending: PHYSICAL MEDICINE & REHABILITATION
Payer: COMMERCIAL

## 2021-01-09 ENCOUNTER — HOSPITAL ENCOUNTER (OUTPATIENT)
Dept: ULTRASOUND IMAGING | Age: 61
Discharge: HOME | End: 2021-01-09
Attending: INTERNAL MEDICINE
Payer: COMMERCIAL

## 2021-01-09 DIAGNOSIS — I71.4 ABDOMINAL AORTIC ANEURYSM (AAA) WITHOUT RUPTURE (HCC): ICD-10-CM

## 2021-01-09 DIAGNOSIS — M48.061 SPINAL STENOSIS OF LUMBAR REGION WITHOUT NEUROGENIC CLAUDICATION: ICD-10-CM

## 2021-01-09 PROCEDURE — 76770 US EXAM ABDO BACK WALL COMP: CPT | Performed by: INTERNAL MEDICINE

## 2021-01-09 PROCEDURE — 72148 MRI LUMBAR SPINE W/O DYE: CPT | Performed by: PHYSICAL MEDICINE & REHABILITATION

## 2021-01-11 ENCOUNTER — TELEPHONE (OUTPATIENT)
Dept: NEUROLOGY | Facility: CLINIC | Age: 61
End: 2021-01-11

## 2021-01-11 NOTE — TELEPHONE ENCOUNTER
----- Message from Ramsey He DO sent at 1/11/2021 10:18 AM CST -----  Please let the patient know the findings are not significantly changed since the last time the imaging was done - stenosis or narrowing at one of the middle levels of the spine with

## 2021-01-14 NOTE — TELEPHONE ENCOUNTER
Informed patient of imaging results and recommendation. Patient states Dr. Darrick Fernandez is aware of the aortic dilation and is watching it. Patient given appt on 01/26/21.

## 2021-01-26 ENCOUNTER — OFFICE VISIT (OUTPATIENT)
Dept: NEUROLOGY | Facility: CLINIC | Age: 61
End: 2021-01-26
Payer: COMMERCIAL

## 2021-01-26 VITALS — BODY MASS INDEX: 32.08 KG/M2 | WEIGHT: 250 LBS | HEIGHT: 74 IN

## 2021-01-26 DIAGNOSIS — M48.061 SPINAL STENOSIS OF LUMBAR REGION WITHOUT NEUROGENIC CLAUDICATION: Primary | ICD-10-CM

## 2021-01-26 PROCEDURE — 99214 OFFICE O/P EST MOD 30 MIN: CPT | Performed by: PHYSICAL MEDICINE & REHABILITATION

## 2021-01-26 PROCEDURE — 3008F BODY MASS INDEX DOCD: CPT | Performed by: PHYSICAL MEDICINE & REHABILITATION

## 2021-01-26 RX ORDER — DULOXETIN HYDROCHLORIDE 30 MG/1
30 CAPSULE, DELAYED RELEASE ORAL DAILY
Qty: 30 CAPSULE | Refills: 0 | Status: SHIPPED | OUTPATIENT
Start: 2021-01-26 | End: 2022-01-27

## 2021-01-26 NOTE — PROGRESS NOTES
Progress note    C/C: Low back pain    HPI: 80-year-old male presents for follow-up. Back pain has been worsening since he has returned to work.   He continues to have constant midline lower back pain described as sharp with associated spasms in the left a

## 2021-02-01 ENCOUNTER — TELEPHONE (OUTPATIENT)
Dept: INTERNAL MEDICINE CLINIC | Facility: CLINIC | Age: 61
End: 2021-02-01

## 2021-02-01 DIAGNOSIS — Z00.00 ROUTINE PHYSICAL EXAMINATION: Primary | ICD-10-CM

## 2021-02-02 NOTE — TELEPHONE ENCOUNTER
Will pt be making an appt for a follow up or routine physical exam. This will affect how we order this. Pt also needs to make the appt fro the visit.

## 2021-02-08 NOTE — TELEPHONE ENCOUNTER
Patient contacted, states will be scheduling a physical with you in late March. Requesting Lab orders.

## 2021-02-27 ENCOUNTER — LAB ENCOUNTER (OUTPATIENT)
Dept: LAB | Age: 61
End: 2021-02-27
Attending: INTERNAL MEDICINE
Payer: COMMERCIAL

## 2021-02-27 DIAGNOSIS — Z00.00 ROUTINE PHYSICAL EXAMINATION: ICD-10-CM

## 2021-02-27 LAB
ALBUMIN SERPL-MCNC: 4.2 G/DL (ref 3.4–5)
ALBUMIN/GLOB SERPL: 1.4 {RATIO} (ref 1–2)
ALP LIVER SERPL-CCNC: 57 U/L
ALT SERPL-CCNC: 24 U/L
ANION GAP SERPL CALC-SCNC: 2 MMOL/L (ref 0–18)
AST SERPL-CCNC: 12 U/L (ref 15–37)
BASOPHILS # BLD AUTO: 0.12 X10(3) UL (ref 0–0.2)
BASOPHILS NFR BLD AUTO: 1.8 %
BILIRUB SERPL-MCNC: 0.5 MG/DL (ref 0.1–2)
BUN BLD-MCNC: 16 MG/DL (ref 7–18)
BUN/CREAT SERPL: 15.5 (ref 10–20)
CALCIUM BLD-MCNC: 9.3 MG/DL (ref 8.5–10.1)
CHLORIDE SERPL-SCNC: 106 MMOL/L (ref 98–112)
CHOLEST SMN-MCNC: 198 MG/DL (ref ?–200)
CO2 SERPL-SCNC: 32 MMOL/L (ref 21–32)
COMPLEXED PSA SERPL-MCNC: 2.36 NG/ML (ref ?–4)
CREAT BLD-MCNC: 1.03 MG/DL
DEPRECATED RDW RBC AUTO: 42.3 FL (ref 35.1–46.3)
EOSINOPHIL # BLD AUTO: 0.16 X10(3) UL (ref 0–0.7)
EOSINOPHIL NFR BLD AUTO: 2.4 %
ERYTHROCYTE [DISTWIDTH] IN BLOOD BY AUTOMATED COUNT: 12.1 % (ref 11–15)
GLOBULIN PLAS-MCNC: 3.1 G/DL (ref 2.8–4.4)
GLUCOSE BLD-MCNC: 100 MG/DL (ref 70–99)
HCT VFR BLD AUTO: 44.7 %
HDLC SERPL-MCNC: 37 MG/DL (ref 40–59)
HGB BLD-MCNC: 15 G/DL
IMM GRANULOCYTES # BLD AUTO: 0.02 X10(3) UL (ref 0–1)
IMM GRANULOCYTES NFR BLD: 0.3 %
LDLC SERPL CALC-MCNC: 136 MG/DL (ref ?–100)
LYMPHOCYTES # BLD AUTO: 1.83 X10(3) UL (ref 1–4)
LYMPHOCYTES NFR BLD AUTO: 27 %
M PROTEIN MFR SERPL ELPH: 7.3 G/DL (ref 6.4–8.2)
MCH RBC QN AUTO: 31.7 PG (ref 26–34)
MCHC RBC AUTO-ENTMCNC: 33.6 G/DL (ref 31–37)
MCV RBC AUTO: 94.5 FL
MONOCYTES # BLD AUTO: 0.73 X10(3) UL (ref 0.1–1)
MONOCYTES NFR BLD AUTO: 10.8 %
NEUTROPHILS # BLD AUTO: 3.93 X10 (3) UL (ref 1.5–7.7)
NEUTROPHILS # BLD AUTO: 3.93 X10(3) UL (ref 1.5–7.7)
NEUTROPHILS NFR BLD AUTO: 57.7 %
NONHDLC SERPL-MCNC: 161 MG/DL (ref ?–130)
OSMOLALITY SERPL CALC.SUM OF ELEC: 291 MOSM/KG (ref 275–295)
PATIENT FASTING Y/N/NP: YES
PATIENT FASTING Y/N/NP: YES
PLATELET # BLD AUTO: 197 10(3)UL (ref 150–450)
POTASSIUM SERPL-SCNC: 4.5 MMOL/L (ref 3.5–5.1)
RBC # BLD AUTO: 4.73 X10(6)UL
SODIUM SERPL-SCNC: 140 MMOL/L (ref 136–145)
TRIGL SERPL-MCNC: 124 MG/DL (ref 30–149)
TSI SER-ACNC: 2.89 MIU/ML (ref 0.36–3.74)
VLDLC SERPL CALC-MCNC: 25 MG/DL (ref 0–30)
WBC # BLD AUTO: 6.8 X10(3) UL (ref 4–11)

## 2021-02-27 PROCEDURE — 84443 ASSAY THYROID STIM HORMONE: CPT

## 2021-02-27 PROCEDURE — 85025 COMPLETE CBC W/AUTO DIFF WBC: CPT

## 2021-02-27 PROCEDURE — 80053 COMPREHEN METABOLIC PANEL: CPT

## 2021-02-27 PROCEDURE — 80061 LIPID PANEL: CPT

## 2021-02-27 PROCEDURE — 36415 COLL VENOUS BLD VENIPUNCTURE: CPT

## 2021-04-04 RX ORDER — MELOXICAM 15 MG/1
TABLET ORAL
Qty: 30 TABLET | Refills: 1 | Status: SHIPPED | OUTPATIENT
Start: 2021-04-04

## 2021-04-04 RX ORDER — LOSARTAN POTASSIUM AND HYDROCHLOROTHIAZIDE 25; 100 MG/1; MG/1
TABLET ORAL
Qty: 90 TABLET | Refills: 1 | Status: SHIPPED | OUTPATIENT
Start: 2021-04-04 | End: 2021-08-14

## 2021-05-12 ENCOUNTER — OFFICE VISIT (OUTPATIENT)
Dept: INTERNAL MEDICINE CLINIC | Facility: CLINIC | Age: 61
End: 2021-05-12
Payer: COMMERCIAL

## 2021-05-12 VITALS
HEART RATE: 76 BPM | BODY MASS INDEX: 31.06 KG/M2 | WEIGHT: 242 LBS | HEIGHT: 74 IN | DIASTOLIC BLOOD PRESSURE: 80 MMHG | SYSTOLIC BLOOD PRESSURE: 144 MMHG | RESPIRATION RATE: 20 BRPM

## 2021-05-12 DIAGNOSIS — M51.9 LUMBAR DISC DISEASE: ICD-10-CM

## 2021-05-12 DIAGNOSIS — F17.200 TOBACCO USE DISORDER: ICD-10-CM

## 2021-05-12 DIAGNOSIS — Z00.00 ROUTINE PHYSICAL EXAMINATION: Primary | ICD-10-CM

## 2021-05-12 DIAGNOSIS — I71.4 ABDOMINAL AORTIC ANEURYSM (AAA) WITHOUT RUPTURE (HCC): ICD-10-CM

## 2021-05-12 DIAGNOSIS — Z12.11 COLON CANCER SCREENING: ICD-10-CM

## 2021-05-12 DIAGNOSIS — I10 ESSENTIAL HYPERTENSION WITH GOAL BLOOD PRESSURE LESS THAN 140/90: ICD-10-CM

## 2021-05-12 PROCEDURE — 3008F BODY MASS INDEX DOCD: CPT | Performed by: INTERNAL MEDICINE

## 2021-05-12 PROCEDURE — 3079F DIAST BP 80-89 MM HG: CPT | Performed by: INTERNAL MEDICINE

## 2021-05-12 PROCEDURE — 3077F SYST BP >= 140 MM HG: CPT | Performed by: INTERNAL MEDICINE

## 2021-05-12 PROCEDURE — 99396 PREV VISIT EST AGE 40-64: CPT | Performed by: INTERNAL MEDICINE

## 2021-05-12 RX ORDER — COMPRESS.STOCKING,KNEE,REG,LRG
EACH MISCELLANEOUS
COMMUNITY
Start: 2021-04-21 | End: 2021-05-12

## 2021-05-12 RX ORDER — AMLODIPINE BESYLATE 10 MG/1
10 TABLET ORAL DAILY
Qty: 90 TABLET | Refills: 1 | Status: SHIPPED | OUTPATIENT
Start: 2021-05-12 | End: 2021-08-14

## 2021-05-12 NOTE — PROGRESS NOTES
HPI:    Patient ID: Dee Diaz is a 64year old male. HPI  Patient is here requesting a general physical exam and follow-up on chronic medical problems as listed below. Last seen here December 11 of last year for low back pain.   He was seeing phys NG: \"(?assoc with vein valve dysfunction?)\" - Coumadin for 3 months   • Essential hypertension with goal blood pressure less than 140/90 12/8/2016   • Extrinsic asthma, unspecified     triggered by enviromental allergies   • H/O blood clots    • L3-4 jenae daily.     • Multiple Vitamin (MULTI-VITAMINS) Oral Tab Take  by mouth. • B Complex Vitamins (B-COMPLEX/B-12 TR) Oral Tab CR Take  by mouth.        Allergies:  Pollen                  Runny nose     PHYSICAL EXAM:   /80 (BP Location: Left arm, Linda Roller 4.8 oz (109.9 kg)  11/04/19 : 240 lb (108.9 kg)  09/11/18 : 253 lb 12.8 oz (115.1 kg)            ASSESSMENT/PLAN:   1. Routine physical examination   Physical exam remarkable for elevated blood pressure and obesity.   Advised diet and exercise and weight lo

## 2021-06-24 ENCOUNTER — LAB ENCOUNTER (OUTPATIENT)
Dept: LAB | Facility: HOSPITAL | Age: 61
End: 2021-06-24
Attending: NURSE PRACTITIONER
Payer: COMMERCIAL

## 2021-06-24 ENCOUNTER — OFFICE VISIT (OUTPATIENT)
Dept: INTERNAL MEDICINE CLINIC | Facility: CLINIC | Age: 61
End: 2021-06-24
Payer: COMMERCIAL

## 2021-06-24 VITALS
SYSTOLIC BLOOD PRESSURE: 120 MMHG | DIASTOLIC BLOOD PRESSURE: 72 MMHG | HEART RATE: 75 BPM | WEIGHT: 250.69 LBS | HEIGHT: 74 IN | BODY MASS INDEX: 32.17 KG/M2

## 2021-06-24 DIAGNOSIS — I49.9 IRREGULAR HEART RATE: Primary | ICD-10-CM

## 2021-06-24 DIAGNOSIS — I10 ESSENTIAL HYPERTENSION WITH GOAL BLOOD PRESSURE LESS THAN 140/90: ICD-10-CM

## 2021-06-24 DIAGNOSIS — F17.210 CIGARETTE SMOKER: ICD-10-CM

## 2021-06-24 PROCEDURE — 3074F SYST BP LT 130 MM HG: CPT | Performed by: NURSE PRACTITIONER

## 2021-06-24 PROCEDURE — 93010 ELECTROCARDIOGRAM REPORT: CPT | Performed by: NURSE PRACTITIONER

## 2021-06-24 PROCEDURE — 99214 OFFICE O/P EST MOD 30 MIN: CPT | Performed by: NURSE PRACTITIONER

## 2021-06-24 PROCEDURE — 3008F BODY MASS INDEX DOCD: CPT | Performed by: NURSE PRACTITIONER

## 2021-06-24 PROCEDURE — 3078F DIAST BP <80 MM HG: CPT | Performed by: NURSE PRACTITIONER

## 2021-06-24 PROCEDURE — 93005 ELECTROCARDIOGRAM TRACING: CPT

## 2021-06-24 PROCEDURE — 99406 BEHAV CHNG SMOKING 3-10 MIN: CPT | Performed by: NURSE PRACTITIONER

## 2021-06-24 NOTE — PROGRESS NOTES
HPI:    Patient ID: Tyson Barnard is a 64year old male. HPI Hypertension  Patient here for B/P follow up. 132/74. Last visit. Essential hypertension with goal blood pressure less than 140/90.   Dr Mayank Freeman last recommendation was to    Increase amlod level: Not on file    Tobacco Use      Smoking status: Current Some Day Smoker        Packs/day: 0.30        Years: 40.00        Pack years: 12        Types: Cigarettes      Smokeless tobacco: Never Used      Tobacco comment: taking chantix    Vaping Use tablet 1   • LOSARTAN POTASSIUM-HCTZ 100-25 MG Oral Tab TAKE 1 TABLET BY MOUTH DAILY 90 tablet 1   • DULoxetine HCl 30 MG Oral Cap DR Particles Take 1 capsule (30 mg total) by mouth daily.  30 capsule 0   • GLUCOSAMINE CHONDROITIN COMPLX OR Take 750 mg by m cervical adenopathy. Skin:     General: Skin is warm and dry. Findings: No rash. Neurological:      Mental Status: He is alert and oriented to person, place, and time.       Coordination: Coordination normal.      Gait: Gait normal.   Psychiatric: behavioral health risk and factors affecting choice of behavior change goals/methods. Specifically I asked about readiness to quit tobacco.  Advise:  We gave clear, specific, and personalized behavior change advice, including information about personal hea

## 2021-06-24 NOTE — ASSESSMENT & PLAN NOTE
A/P Patient has hx of abdominal aneurysm and needs to keep blood pressure low. Dr. Portillo Seymour had increased his amlodipine to 10 mg daily. He is also on losartan hydrochlorothiazide 100-25 mg po daily. On physical exam irregular heart rate noted.   Manual B/P

## 2021-06-28 ENCOUNTER — TELEPHONE (OUTPATIENT)
Dept: INTERNAL MEDICINE CLINIC | Facility: CLINIC | Age: 61
End: 2021-06-28

## 2021-06-28 NOTE — TELEPHONE ENCOUNTER
Gladys Clemente    Patient called. No answer. Message left to call Danielle Infante nurse practitioner at the office to discuss EKG.     GRETA Solano  Working with Carlos Lipoma

## 2021-06-29 ENCOUNTER — TELEPHONE (OUTPATIENT)
Dept: INTERNAL MEDICINE CLINIC | Facility: CLINIC | Age: 61
End: 2021-06-29

## 2021-06-29 NOTE — TELEPHONE ENCOUNTER
Patient called. No answer. Advised that I will be in the office all day today. Ask to put the call through to me so we can discuss your EKG.     GRETA Lemus  Working with Lizet Velasquez

## 2021-07-03 ENCOUNTER — HOSPITAL ENCOUNTER (OUTPATIENT)
Dept: CV DIAGNOSTICS | Facility: HOSPITAL | Age: 61
Discharge: HOME | End: 2021-07-03
Attending: NURSE PRACTITIONER
Payer: COMMERCIAL

## 2021-07-03 DIAGNOSIS — R94.31 EKG ABNORMALITY: ICD-10-CM

## 2021-07-03 PROCEDURE — 93227 XTRNL ECG REC<48 HR R&I: CPT | Performed by: NURSE PRACTITIONER

## 2021-07-03 PROCEDURE — 93225 XTRNL ECG REC<48 HRS REC: CPT | Performed by: NURSE PRACTITIONER

## 2021-07-03 PROCEDURE — 93226 XTRNL ECG REC<48 HR SCAN A/R: CPT | Performed by: NURSE PRACTITIONER

## 2021-07-07 ENCOUNTER — TELEPHONE (OUTPATIENT)
Dept: NEUROLOGY | Facility: CLINIC | Age: 61
End: 2021-07-07

## 2021-07-07 NOTE — PROGRESS NOTES
{;ease send letter  Message left on VM. Please follow up with Cardiologist for irregular beats on 24 hour Holter monitor.   Phone number for any one of our excellent cardiologist. [phone]    GRETA Do  Working with [facility]

## 2021-07-10 NOTE — PROGRESS NOTES
Patient was called with frequent PVCs on EKG  Holter monitor was ordered- Results sent to patient to follow up with Cardiologist.  Kenia Oswald noted in EMR.     GRETA Shahid  File to chart

## 2021-07-15 NOTE — TELEPHONE ENCOUNTER
Patient will need a follow up appointment. Dr. Krista Ruggiero agreed before completion of his forms. Left message on VM to schedule a follow up apppointment- 1st attempt. Please schedule appointment.

## 2021-07-15 NOTE — TELEPHONE ENCOUNTER
Please contact patient for a status update. Is it his back that is bothering him and is he still working?

## 2021-07-16 ENCOUNTER — TELEPHONE (OUTPATIENT)
Dept: NEUROLOGY | Facility: CLINIC | Age: 61
End: 2021-07-16

## 2021-07-16 NOTE — TELEPHONE ENCOUNTER
LMTCB    PSR- Patient will need a follow up appointment. Dr. Ashanti Fontaine agreed before completion of his forms.      2ND MESSAGE LEFT   Left message on VM to schedule a follow up apppointment- 1st attempt.      Please schedule appointment.

## 2021-07-20 ENCOUNTER — OFFICE VISIT (OUTPATIENT)
Dept: CARDIOLOGY CLINIC | Facility: CLINIC | Age: 61
End: 2021-07-20
Payer: COMMERCIAL

## 2021-07-20 VITALS
RESPIRATION RATE: 18 BRPM | DIASTOLIC BLOOD PRESSURE: 78 MMHG | BODY MASS INDEX: 31.7 KG/M2 | WEIGHT: 247 LBS | SYSTOLIC BLOOD PRESSURE: 134 MMHG | HEIGHT: 74 IN | HEART RATE: 80 BPM

## 2021-07-20 DIAGNOSIS — I10 ESSENTIAL HYPERTENSION WITH GOAL BLOOD PRESSURE LESS THAN 140/90: Primary | ICD-10-CM

## 2021-07-20 DIAGNOSIS — I49.3 PVC (PREMATURE VENTRICULAR CONTRACTION): ICD-10-CM

## 2021-07-20 PROCEDURE — 3078F DIAST BP <80 MM HG: CPT | Performed by: NURSE PRACTITIONER

## 2021-07-20 PROCEDURE — 3075F SYST BP GE 130 - 139MM HG: CPT | Performed by: NURSE PRACTITIONER

## 2021-07-20 PROCEDURE — 3008F BODY MASS INDEX DOCD: CPT | Performed by: NURSE PRACTITIONER

## 2021-07-20 PROCEDURE — 99213 OFFICE O/P EST LOW 20 MIN: CPT | Performed by: NURSE PRACTITIONER

## 2021-07-20 RX ORDER — ASPIRIN 81 MG/1
81 TABLET ORAL DAILY
COMMUNITY

## 2021-07-20 NOTE — PROGRESS NOTES
Dee Lilly is a 64year old male. Patient presents with:  Consult: discuss cardiac test    HPI:   Patient comes in today for consultation. He sees Dr. Liana Goldberg he has a history of hypertension osteoarthritis foraminal stenosis L3-4.   He was seen by his osteoarthritis of hip 12/12/2017   • Cold with flu 3/22/15    Type B Influenza - began antibiotic on 3/25/15; no fever since 3/28/15   • DVT (deep venous thrombosis) ([facility].) 2007    per NG: \"(?assoc with vein valve dysfunction?)\" - Coumadin for 3 months - Primary    Relevant Orders    BASIC METABOLIC PANEL (8)    CBC, PLATELET; NO DIFFERENTIAL      Other Visit Diagnoses     PVC (premature ventricular contraction)        Relevant Orders    CARD ECHO 2D DOPPLER (CPT=93306)    CARD NUC STRESS TEST LEXISCAN (

## 2021-08-03 ENCOUNTER — OFFICE VISIT (OUTPATIENT)
Dept: NEUROLOGY | Facility: CLINIC | Age: 61
End: 2021-08-03
Payer: COMMERCIAL

## 2021-08-03 ENCOUNTER — TELEPHONE (OUTPATIENT)
Dept: NEUROLOGY | Facility: CLINIC | Age: 61
End: 2021-08-03

## 2021-08-03 VITALS — WEIGHT: 147 LBS | HEIGHT: 73 IN | BODY MASS INDEX: 19.48 KG/M2

## 2021-08-03 DIAGNOSIS — G25.2 INTENTION TREMOR: Primary | ICD-10-CM

## 2021-08-03 DIAGNOSIS — M17.0 PRIMARY OSTEOARTHRITIS OF BOTH KNEES: ICD-10-CM

## 2021-08-03 DIAGNOSIS — M25.661 STIFFNESS OF BOTH KNEES: ICD-10-CM

## 2021-08-03 DIAGNOSIS — M54.50 CHRONIC BILATERAL LOW BACK PAIN WITHOUT SCIATICA: ICD-10-CM

## 2021-08-03 DIAGNOSIS — G89.29 CHRONIC BILATERAL LOW BACK PAIN WITHOUT SCIATICA: ICD-10-CM

## 2021-08-03 DIAGNOSIS — M25.662 STIFFNESS OF BOTH KNEES: ICD-10-CM

## 2021-08-03 PROCEDURE — 3008F BODY MASS INDEX DOCD: CPT | Performed by: PHYSICAL MEDICINE & REHABILITATION

## 2021-08-03 PROCEDURE — 99214 OFFICE O/P EST MOD 30 MIN: CPT | Performed by: PHYSICAL MEDICINE & REHABILITATION

## 2021-08-03 RX ORDER — MULTIVITAMIN
TABLET ORAL
COMMUNITY

## 2021-08-03 RX ORDER — METHYLPREDNISOLONE 4 MG/1
TABLET ORAL
Qty: 1 EACH | Refills: 0 | Status: SHIPPED | OUTPATIENT
Start: 2021-08-03 | End: 2021-08-18

## 2021-08-03 NOTE — PROGRESS NOTES
Progress note    C/C: lower back, pain in both knees    HPI: 17-year-old male presents for follow-up. He has been having increasing lower back and bilateral knee pain that has limited his ability to tolerate movement.   He has been attempting to work half bilaterally  No appreciable rigidity with PROM of the wrists and elbows  2/4 UE and LE bilaterally  Resting tremor in the left upper extremity.  He develops an intention tremor with movement that affects both upper extremities and also his neck/head    Imag

## 2021-08-03 NOTE — TELEPHONE ENCOUNTER
Contacted [employer] online to initiate authorization for L-Spine MRI CPT 19928 to be done at [address] [place] [address].     Coverage is active  Status: Approved with order #[id number] valid 8/3/21-9/1/21    All approved cpt codes with this authorization are 99946, 40177, 48583    LM in

## 2021-08-09 ENCOUNTER — MED REC SCAN ONLY (OUTPATIENT)
Dept: NEUROLOGY | Facility: CLINIC | Age: 61
End: 2021-08-09

## 2021-08-09 ENCOUNTER — HOSPITAL ENCOUNTER (OUTPATIENT)
Dept: CV DIAGNOSTICS | Facility: HOSPITAL | Age: 61
Discharge: HOME | End: 2021-08-09
Attending: NURSE PRACTITIONER
Payer: COMMERCIAL

## 2021-08-09 DIAGNOSIS — I49.3 PVC (PREMATURE VENTRICULAR CONTRACTION): ICD-10-CM

## 2021-08-09 PROCEDURE — 93306 TTE W/DOPPLER COMPLETE: CPT | Performed by: NURSE PRACTITIONER

## 2021-08-11 ENCOUNTER — HOSPITAL ENCOUNTER (OUTPATIENT)
Dept: CV DIAGNOSTICS | Facility: HOSPITAL | Age: 61
Discharge: HOME | End: 2021-08-11
Attending: NURSE PRACTITIONER
Payer: COMMERCIAL

## 2021-08-11 ENCOUNTER — HOSPITAL ENCOUNTER (OUTPATIENT)
Dept: NUCLEAR MEDICINE | Facility: HOSPITAL | Age: 61
Discharge: HOME | End: 2021-08-11
Attending: NURSE PRACTITIONER
Payer: COMMERCIAL

## 2021-08-11 DIAGNOSIS — I49.3 PVC (PREMATURE VENTRICULAR CONTRACTION): ICD-10-CM

## 2021-08-11 PROCEDURE — 78452 HT MUSCLE IMAGE SPECT MULT: CPT | Performed by: NURSE PRACTITIONER

## 2021-08-11 PROCEDURE — 93017 CV STRESS TEST TRACING ONLY: CPT | Performed by: NURSE PRACTITIONER

## 2021-08-11 PROCEDURE — 93016 CV STRESS TEST SUPVJ ONLY: CPT | Performed by: NURSE PRACTITIONER

## 2021-08-11 PROCEDURE — 93018 CV STRESS TEST I&R ONLY: CPT | Performed by: NURSE PRACTITIONER

## 2021-08-13 ENCOUNTER — TELEPHONE (OUTPATIENT)
Dept: CARDIOLOGY CLINIC | Facility: CLINIC | Age: 61
End: 2021-08-13

## 2021-08-13 DIAGNOSIS — I10 ESSENTIAL HYPERTENSION WITH GOAL BLOOD PRESSURE LESS THAN 140/90: Primary | ICD-10-CM

## 2021-08-13 DIAGNOSIS — R94.39 ABNORMAL STRESS TEST: ICD-10-CM

## 2021-08-13 RX ORDER — METOPROLOL TARTRATE 50 MG/1
50 TABLET, FILM COATED ORAL AS DIRECTED
Qty: 2 TABLET | Refills: 0 | Status: SHIPPED | OUTPATIENT
Start: 2021-08-13 | End: 2021-08-18

## 2021-08-13 NOTE — TELEPHONE ENCOUNTER
Result Notes   PEPE Motta   8/13/2021  1:57 PM CDT Back to Top      Suboptimal images on stress test, possible moderate reversible ischemia, recommend CTA of coronaries      Impression  CONCLUSION:   1.  Extremely suboptimal study due to

## 2021-08-13 NOTE — TELEPHONE ENCOUNTER
Called Payton House back, reviewed stress test with him and advised on recommendation for CTA, instructed to take lopressor 50 mg the night before the test and 50 mg the morning of the test. Orders entered.

## 2021-08-14 RX ORDER — LOSARTAN POTASSIUM AND HYDROCHLOROTHIAZIDE 25; 100 MG/1; MG/1
TABLET ORAL
Qty: 90 TABLET | Refills: 1 | Status: SHIPPED | OUTPATIENT
Start: 2021-08-14

## 2021-08-14 RX ORDER — AMLODIPINE BESYLATE 10 MG/1
TABLET ORAL
Qty: 90 TABLET | Refills: 1 | Status: SHIPPED | OUTPATIENT
Start: 2021-08-14

## 2021-08-18 ENCOUNTER — OFFICE VISIT (OUTPATIENT)
Dept: NEUROLOGY | Facility: CLINIC | Age: 61
End: 2021-08-18
Payer: COMMERCIAL

## 2021-08-18 ENCOUNTER — TELEPHONE (OUTPATIENT)
Dept: CARDIOLOGY CLINIC | Facility: CLINIC | Age: 61
End: 2021-08-18

## 2021-08-18 VITALS
SYSTOLIC BLOOD PRESSURE: 122 MMHG | DIASTOLIC BLOOD PRESSURE: 78 MMHG | HEART RATE: 84 BPM | BODY MASS INDEX: 19.48 KG/M2 | WEIGHT: 147 LBS | HEIGHT: 73 IN

## 2021-08-18 DIAGNOSIS — G25.0 ESSENTIAL TREMOR: Primary | ICD-10-CM

## 2021-08-18 DIAGNOSIS — R94.39 ABNORMAL STRESS TEST: Primary | ICD-10-CM

## 2021-08-18 PROCEDURE — 99204 OFFICE O/P NEW MOD 45 MIN: CPT | Performed by: OTHER

## 2021-08-18 PROCEDURE — 3008F BODY MASS INDEX DOCD: CPT | Performed by: OTHER

## 2021-08-18 PROCEDURE — 3074F SYST BP LT 130 MM HG: CPT | Performed by: OTHER

## 2021-08-18 PROCEDURE — 3078F DIAST BP <80 MM HG: CPT | Performed by: OTHER

## 2021-08-18 RX ORDER — PROPRANOLOL HYDROCHLORIDE 20 MG/1
TABLET ORAL
Qty: 120 TABLET | Refills: 3 | Status: SHIPPED | OUTPATIENT
Start: 2021-08-18 | End: 2021-09-21

## 2021-08-18 NOTE — TELEPHONE ENCOUNTER
Pt has stress test done on 8/9 at [address] [place] [address] - there were no results readable from this test - pt paid $900 for copay for this test - billing dept told him the ordering Dr had to authorize for them to return co pay amount to pt

## 2021-08-18 NOTE — PROGRESS NOTES
Neurology Initial Visit     Referred By: Dr. Medina ref. provider found    Chief Complaint: Patient presents with:  Tremors: Patient presents today c/o tremors in bilateral hands, started about 10-12 months ago, right > left.  Patient states that the tremors HISTORY Left 04/06/2015    Left quadriceps tendon repair       Social history:    Smoking status: Current Some Day Smoker 0.30 Packs/day For 40.00 Years   Types: Cigarettes   Smokeless tobacco: Never Used   Comment: taking chantix       Alcohol use Yes 6.0 atraumatic  Eyes- No redness or swelling  ENT- Hearing intake, normal glutition  Neck- No masses or adenopathy  Cv: pulses were palpable and normal, no cyanosis or edema     Neurological:     Mental Status- Alert and oriented x3.   Normal attention span and  02/27/2021    K 4.5 02/27/2021     02/27/2021    CO2 32.0 02/27/2021      I have reviewed labs. Assessment   1. Essential tremor  Most likely patient is describing essential tremor. No family history since he is adopted.   Clinic will p

## 2021-08-18 NOTE — TELEPHONE ENCOUNTER
CTA all ready scheduled. Fatoumata Newman RN   8/13/2021 2:37 PM CDT Back to Top   Detailed message left (HIPAA verified)   Left message to call the office back to review results.     PEPE Marion   8/13/2021 1:57 PM CDT    Suboptimal images on stress test, possible moderate reversible ischemia, recommend CTA of coronaries

## 2021-08-19 NOTE — TELEPHONE ENCOUNTER
Jair Conner, reviewed stress test results, went over CTA  recommendation, to take lopressor 50 mg night before and morning of procedure.   Patient to start propanolol per neurology but haven't started yet, will pickup on Monday

## 2021-08-20 ENCOUNTER — HOSPITAL ENCOUNTER (OUTPATIENT)
Dept: GENERAL RADIOLOGY | Age: 61
Discharge: HOME | End: 2021-08-20
Attending: PHYSICAL MEDICINE & REHABILITATION
Payer: COMMERCIAL

## 2021-08-20 ENCOUNTER — HOSPITAL ENCOUNTER (OUTPATIENT)
Dept: MRI IMAGING | Age: 61
Discharge: HOME | End: 2021-08-20
Attending: PHYSICAL MEDICINE & REHABILITATION
Payer: COMMERCIAL

## 2021-08-20 DIAGNOSIS — M17.0 PRIMARY OSTEOARTHRITIS OF BOTH KNEES: ICD-10-CM

## 2021-08-20 DIAGNOSIS — G89.29 CHRONIC BILATERAL LOW BACK PAIN WITHOUT SCIATICA: ICD-10-CM

## 2021-08-20 DIAGNOSIS — M54.50 CHRONIC BILATERAL LOW BACK PAIN WITHOUT SCIATICA: ICD-10-CM

## 2021-08-20 PROCEDURE — 72148 MRI LUMBAR SPINE W/O DYE: CPT | Performed by: PHYSICAL MEDICINE & REHABILITATION

## 2021-08-20 PROCEDURE — 73564 X-RAY EXAM KNEE 4 OR MORE: CPT | Performed by: PHYSICAL MEDICINE & REHABILITATION

## 2021-08-23 ENCOUNTER — HOSPITAL ENCOUNTER (OUTPATIENT)
Dept: CT IMAGING | Facility: HOSPITAL | Age: 61
Discharge: HOME | End: 2021-08-23
Attending: NURSE PRACTITIONER
Payer: COMMERCIAL

## 2021-08-23 VITALS
RESPIRATION RATE: 16 BRPM | HEIGHT: 71 IN | DIASTOLIC BLOOD PRESSURE: 75 MMHG | BODY MASS INDEX: 35 KG/M2 | HEART RATE: 79 BPM | SYSTOLIC BLOOD PRESSURE: 139 MMHG | WEIGHT: 250 LBS

## 2021-08-23 DIAGNOSIS — I10 ESSENTIAL HYPERTENSION WITH GOAL BLOOD PRESSURE LESS THAN 140/90: ICD-10-CM

## 2021-08-23 DIAGNOSIS — R94.39 ABNORMAL STRESS TEST: ICD-10-CM

## 2021-08-23 LAB — CREAT BLD-MCNC: 0.9 MG/DL

## 2021-08-23 PROCEDURE — 82565 ASSAY OF CREATININE: CPT

## 2021-08-23 PROCEDURE — 0503T CTA FRACTIONAL FLOW RESERVE ANALYSIS (CPT=0503T/0502T): CPT | Performed by: NURSE PRACTITIONER

## 2021-08-23 PROCEDURE — 0502T CTA FRACTIONAL FLOW RESERVE ANALYSIS (CPT=0503T/0502T): CPT | Performed by: NURSE PRACTITIONER

## 2021-08-23 PROCEDURE — 75574 CT ANGIO HRT W/3D IMAGE: CPT | Performed by: NURSE PRACTITIONER

## 2021-08-23 RX ORDER — METOPROLOL TARTRATE 5 MG/5ML
5 INJECTION INTRAVENOUS SEE ADMIN INSTRUCTIONS
Status: DISCONTINUED | OUTPATIENT
Start: 2021-08-23 | End: 2021-08-25

## 2021-08-23 RX ORDER — METOPROLOL TARTRATE 5 MG/5ML
INJECTION INTRAVENOUS
Status: DISCONTINUED
Start: 2021-08-23 | End: 2021-08-23 | Stop reason: WASHOUT

## 2021-08-23 RX ORDER — NITROGLYCERIN 0.4 MG/1
TABLET SUBLINGUAL
Status: COMPLETED
Start: 2021-08-23 | End: 2021-08-23

## 2021-08-23 RX ORDER — DILTIAZEM HYDROCHLORIDE 5 MG/ML
5 INJECTION INTRAVENOUS SEE ADMIN INSTRUCTIONS
Status: DISCONTINUED | OUTPATIENT
Start: 2021-08-23 | End: 2021-08-25

## 2021-08-23 RX ORDER — NITROGLYCERIN 0.4 MG/1
0.4 TABLET SUBLINGUAL ONCE
Status: COMPLETED | OUTPATIENT
Start: 2021-08-23 | End: 2021-08-23

## 2021-08-23 RX ADMIN — Medication 50 MG: at 08:54:00

## 2021-08-23 RX ADMIN — Medication 100 MG: at 08:09:00

## 2021-08-23 RX ADMIN — NITROGLYCERIN 0.4 MG: 0.4 TABLET SUBLINGUAL at 09:37:00

## 2021-08-23 NOTE — IMAGING NOTE
TO RAD HOLDING AT 0800      HX TAKEN : frequent PVC's.      PT CONSENTED AT 0808      BASELINE VITAL SIGNS   HR 79  /75    CTA ORDERED BY DIMAS MARIEEN  WAS PT GIVEN CTA  PREMEDS NO, IF YES  50 MG PO METOPROLOL X2 DOSES    METOPROLOL PO GIVEN

## 2021-08-25 ENCOUNTER — TELEPHONE (OUTPATIENT)
Dept: NEUROLOGY | Facility: CLINIC | Age: 61
End: 2021-08-25

## 2021-08-25 NOTE — TELEPHONE ENCOUNTER
----- Message from Mary Bello RN sent at 8/24/2021 10:19 AM CDT -----    ----- Message -----  From: Karla Menezes DO  Sent: 8/24/2021  10:17 AM CDT  To: Ashley Escoto    No change in MRI of the lower back since 2017.  Mild osteoarthritis in both

## 2021-08-25 NOTE — TELEPHONE ENCOUNTER
Spoke to patient about test results.  Offered a f/u appointment, patient state he will call back to schedule a f/u appointment

## 2021-08-27 ENCOUNTER — TELEPHONE (OUTPATIENT)
Dept: CARDIOLOGY CLINIC | Facility: CLINIC | Age: 61
End: 2021-08-27

## 2021-08-27 DIAGNOSIS — I10 ESSENTIAL HYPERTENSION WITH GOAL BLOOD PRESSURE LESS THAN 140/90: Primary | ICD-10-CM

## 2021-08-27 RX ORDER — ROSUVASTATIN CALCIUM 20 MG/1
20 TABLET, COATED ORAL NIGHTLY
Qty: 90 TABLET | Refills: 1 | Status: SHIPPED | OUTPATIENT
Start: 2021-08-27 | End: 2021-09-22

## 2021-08-27 NOTE — TELEPHONE ENCOUNTER
Ghazal Chen RN   8/27/2021  2:47 PM CDT       Talked to Tamara Wilson, reviewed CTA results, scheduled appointment with Aubrie Rob. Reviewed CTA results with [facility] APN, coronary calcium score 638, Crestor 20 mg ordered, check lipids in 2 months.

## 2021-09-09 ENCOUNTER — TELEPHONE (OUTPATIENT)
Dept: NEUROLOGY | Facility: CLINIC | Age: 61
End: 2021-09-09

## 2021-09-09 DIAGNOSIS — I63.9 CEREBROVASCULAR ACCIDENT (CVA), UNSPECIFIED MECHANISM (HCC): Primary | ICD-10-CM

## 2021-09-09 NOTE — TELEPHONE ENCOUNTER
Spoke to pt. Pt given number to central scheduling to schedule testing & notified of testing ordered including fasting labs. Pt SRINATH.

## 2021-09-09 NOTE — TELEPHONE ENCOUNTER
[employer] for authorization of approval for CARD MONITOR HOLTER 48 HOUR (CPT=93225). Authorization is not required. Transaction ID: [id number]. [employer] for authorization of approval for  Walt 3 (CPT=93880).  Aut

## 2021-09-21 ENCOUNTER — LAB ENCOUNTER (OUTPATIENT)
Dept: LAB | Facility: HOSPITAL | Age: 61
End: 2021-09-21
Attending: INTERNAL MEDICINE
Payer: COMMERCIAL

## 2021-09-21 ENCOUNTER — TELEPHONE (OUTPATIENT)
Dept: PHYSICAL MEDICINE AND REHAB | Facility: CLINIC | Age: 61
End: 2021-09-21

## 2021-09-21 ENCOUNTER — OFFICE VISIT (OUTPATIENT)
Dept: NEUROLOGY | Facility: CLINIC | Age: 61
End: 2021-09-21
Payer: COMMERCIAL

## 2021-09-21 VITALS
HEIGHT: 73 IN | WEIGHT: 250 LBS | SYSTOLIC BLOOD PRESSURE: 124 MMHG | BODY MASS INDEX: 33.13 KG/M2 | DIASTOLIC BLOOD PRESSURE: 76 MMHG | HEART RATE: 70 BPM

## 2021-09-21 DIAGNOSIS — G25.0 ESSENTIAL TREMOR: ICD-10-CM

## 2021-09-21 DIAGNOSIS — I63.9 CEREBROVASCULAR ACCIDENT (CVA), UNSPECIFIED MECHANISM (HCC): ICD-10-CM

## 2021-09-21 DIAGNOSIS — G25.0 ESSENTIAL TREMOR: Primary | ICD-10-CM

## 2021-09-21 DIAGNOSIS — R27.0 ATAXIA: ICD-10-CM

## 2021-09-21 DIAGNOSIS — I10 ESSENTIAL HYPERTENSION WITH GOAL BLOOD PRESSURE LESS THAN 140/90: ICD-10-CM

## 2021-09-21 LAB
ALT SERPL-CCNC: 38 U/L
ANION GAP SERPL CALC-SCNC: 7 MMOL/L (ref 0–18)
AST SERPL-CCNC: 22 U/L (ref 15–37)
BUN BLD-MCNC: 21 MG/DL (ref 7–18)
BUN/CREAT SERPL: 17.8 (ref 10–20)
CALCIUM BLD-MCNC: 9.7 MG/DL (ref 8.5–10.1)
CERULOPLASMIN SERPL-MCNC: 26.2 MG/DL (ref 20–60)
CHLORIDE SERPL-SCNC: 104 MMOL/L (ref 98–112)
CHOLEST SERPL-MCNC: 160 MG/DL (ref ?–200)
CO2 SERPL-SCNC: 26 MMOL/L (ref 21–32)
CREAT BLD-MCNC: 1.18 MG/DL
DEPRECATED RDW RBC AUTO: 40.9 FL (ref 35.1–46.3)
ERYTHROCYTE [DISTWIDTH] IN BLOOD BY AUTOMATED COUNT: 12.2 % (ref 11–15)
GLUCOSE BLD-MCNC: 119 MG/DL (ref 70–99)
HCT VFR BLD AUTO: 42.3 %
HDLC SERPL-MCNC: 32 MG/DL (ref 40–59)
HGB BLD-MCNC: 14.6 G/DL
LDLC SERPL CALC-MCNC: 79 MG/DL (ref ?–100)
MCH RBC QN AUTO: 31.6 PG (ref 26–34)
MCHC RBC AUTO-ENTMCNC: 34.5 G/DL (ref 31–37)
MCV RBC AUTO: 91.6 FL
NONHDLC SERPL-MCNC: 128 MG/DL (ref ?–130)
OSMOLALITY SERPL CALC.SUM OF ELEC: 288 MOSM/KG (ref 275–295)
PATIENT FASTING Y/N/NP: YES
PATIENT FASTING Y/N/NP: YES
PLATELET # BLD AUTO: 237 10(3)UL (ref 150–450)
POTASSIUM SERPL-SCNC: 4 MMOL/L (ref 3.5–5.1)
RBC # BLD AUTO: 4.62 X10(6)UL
SODIUM SERPL-SCNC: 137 MMOL/L (ref 136–145)
TRIGL SERPL-MCNC: 297 MG/DL (ref 30–149)
VIT B12 SERPL-MCNC: 590 PG/ML (ref 193–986)
VLDLC SERPL CALC-MCNC: 47 MG/DL (ref 0–30)
WBC # BLD AUTO: 8.6 X10(3) UL (ref 4–11)

## 2021-09-21 PROCEDURE — 3008F BODY MASS INDEX DOCD: CPT | Performed by: OTHER

## 2021-09-21 PROCEDURE — 82390 ASSAY OF CERULOPLASMIN: CPT

## 2021-09-21 PROCEDURE — 36415 COLL VENOUS BLD VENIPUNCTURE: CPT | Performed by: OTHER

## 2021-09-21 PROCEDURE — 80061 LIPID PANEL: CPT

## 2021-09-21 PROCEDURE — 84446 ASSAY OF VITAMIN E: CPT | Performed by: OTHER

## 2021-09-21 PROCEDURE — 84460 ALANINE AMINO (ALT) (SGPT): CPT

## 2021-09-21 PROCEDURE — 3078F DIAST BP <80 MM HG: CPT | Performed by: OTHER

## 2021-09-21 PROCEDURE — 84450 TRANSFERASE (AST) (SGOT): CPT

## 2021-09-21 PROCEDURE — 82607 VITAMIN B-12: CPT | Performed by: OTHER

## 2021-09-21 PROCEDURE — 85027 COMPLETE CBC AUTOMATED: CPT

## 2021-09-21 PROCEDURE — 80048 BASIC METABOLIC PNL TOTAL CA: CPT

## 2021-09-21 PROCEDURE — 83921 ORGANIC ACID SINGLE QUANT: CPT

## 2021-09-21 PROCEDURE — 84207 ASSAY OF VITAMIN B-6: CPT | Performed by: OTHER

## 2021-09-21 PROCEDURE — 99214 OFFICE O/P EST MOD 30 MIN: CPT | Performed by: OTHER

## 2021-09-21 PROCEDURE — 3074F SYST BP LT 130 MM HG: CPT | Performed by: OTHER

## 2021-09-21 PROCEDURE — 86780 TREPONEMA PALLIDUM: CPT

## 2021-09-21 RX ORDER — PROPRANOLOL HYDROCHLORIDE 60 MG/1
TABLET ORAL
Qty: 180 TABLET | Refills: 3 | Status: SHIPPED | OUTPATIENT
Start: 2021-09-21

## 2021-09-21 RX ORDER — PROPRANOLOL HYDROCHLORIDE 20 MG/1
TABLET ORAL
Qty: 120 TABLET | Refills: 3 | Status: SHIPPED | OUTPATIENT
Start: 2021-09-21 | End: 2021-09-21

## 2021-09-21 NOTE — TELEPHONE ENCOUNTER
Spoke to patient and notified him of below. He was understanding. He is agreeable to increasing rosuvastatin. Currently he is taking 20 mg daily.

## 2021-09-21 NOTE — TELEPHONE ENCOUNTER
----- Message from Jaclyn Vincent MD sent at 9/21/2021  1:03 PM CDT -----  Please let the patient know that his cholesterol panel still is not normal, some improvement of LDL.   I would still suggest to go up on a low-dose of a statin    Thank you

## 2021-09-21 NOTE — PROGRESS NOTES
Neurology follow-up visit     Referred By: Dr. Medina ref. provider found    Chief Complaint: Patient presents with:  Tremors: Follow up with Dr. Sherren Console. LOV: 8/18/2021. Pt brought images.  Tremors seem a little better since last visit, but he has issues w DVT (deep venous thrombosis) ([facility] 75.) 2007    per NG: \"(?assoc with vein valve dysfunction?)\" - Coumadin for 3 months   • Essential hypertension with goal blood pressure less than 140/90 12/8/2016   • Extrinsic asthma, unspecified     triggered by enviromen capsule (30 mg total) by mouth daily. , Disp: 30 capsule, Rfl: 0  •  GLUCOSAMINE CHONDROITIN COMPLX OR, Take 750 mg by mouth., Disp: , Rfl:   •  Elastic Bandages & Supports (JOBST RELIEF 30-40MMHG LARGE) Does not apply Misc, AS DIRECTED, Disp: , Rfl: 99  • intact    Gait:  Normal posture  Antalgic gait    Labs:    Lab Results   Component Value Date    TSH 2.890 02/27/2021     Lab Results   Component Value Date    HDL 37 (L) 02/27/2021     (H) 02/27/2021    TRIG 124 02/27/2021     Lab Results   Compone to patient. Instructed patient to call my office or seek medical attention immediately if symptoms worsen. Patient verbalized understanding of information given. All questions were answered. All side effects of drugs were discussed.      Return to clinic i

## 2021-09-22 LAB — T PALLIDUM AB SER QL: NEGATIVE

## 2021-09-22 RX ORDER — ROSUVASTATIN CALCIUM 40 MG/1
40 TABLET, COATED ORAL NIGHTLY
Qty: 90 TABLET | Refills: 3 | Status: SHIPPED | OUTPATIENT
Start: 2021-09-22

## 2021-09-23 LAB
ALPHA-TOCOPHEROL (VIT E) -MG/L: 14 MG/L
GAMMA-TOCOPHEROL (VIT E) -MG/L: 1.2 MG/L
MMA: 0.23 UMOL/L

## 2021-09-24 LAB — VITAMIN B6: 88.5 NMOL/L

## 2021-10-04 ENCOUNTER — TELEPHONE (OUTPATIENT)
Dept: NEUROLOGY | Facility: CLINIC | Age: 61
End: 2021-10-04

## 2021-10-04 ENCOUNTER — TELEPHONE (OUTPATIENT)
Dept: CARDIOLOGY CLINIC | Facility: CLINIC | Age: 61
End: 2021-10-04

## 2021-10-04 ENCOUNTER — OFFICE VISIT (OUTPATIENT)
Dept: CARDIOLOGY CLINIC | Facility: CLINIC | Age: 61
End: 2021-10-04
Payer: COMMERCIAL

## 2021-10-04 VITALS
WEIGHT: 254 LBS | BODY MASS INDEX: 34 KG/M2 | DIASTOLIC BLOOD PRESSURE: 74 MMHG | OXYGEN SATURATION: 97 % | SYSTOLIC BLOOD PRESSURE: 136 MMHG | HEART RATE: 71 BPM | TEMPERATURE: 98 F

## 2021-10-04 DIAGNOSIS — R94.39 ABNORMAL CARDIOVASCULAR STRESS TEST: ICD-10-CM

## 2021-10-04 DIAGNOSIS — I71.4 ABDOMINAL AORTIC ANEURYSM (AAA) WITHOUT RUPTURE (HCC): ICD-10-CM

## 2021-10-04 DIAGNOSIS — F17.200 SMOKER: ICD-10-CM

## 2021-10-04 DIAGNOSIS — I10 ESSENTIAL HYPERTENSION: ICD-10-CM

## 2021-10-04 DIAGNOSIS — E78.5 DYSLIPIDEMIA: ICD-10-CM

## 2021-10-04 DIAGNOSIS — I49.3 PVCS (PREMATURE VENTRICULAR CONTRACTIONS): Primary | ICD-10-CM

## 2021-10-04 PROBLEM — I71.40 ABDOMINAL AORTIC ANEURYSM (AAA) WITHOUT RUPTURE: Status: ACTIVE | Noted: 2021-10-04

## 2021-10-04 PROBLEM — I71.40 ABDOMINAL AORTIC ANEURYSM (AAA) WITHOUT RUPTURE (HCC): Status: ACTIVE | Noted: 2021-10-04

## 2021-10-04 PROCEDURE — 99245 OFF/OP CONSLTJ NEW/EST HI 55: CPT | Performed by: INTERNAL MEDICINE

## 2021-10-04 PROCEDURE — 3078F DIAST BP <80 MM HG: CPT | Performed by: INTERNAL MEDICINE

## 2021-10-04 PROCEDURE — 3075F SYST BP GE 130 - 139MM HG: CPT | Performed by: INTERNAL MEDICINE

## 2021-10-04 NOTE — PATIENT INSTRUCTIONS
-Our office will review to schedule cardiac catheterization (angiogram with possible balloon angioplasty or stenting).   -Continue aggressive heart risk factor monitoring and treatment:   -Goal blood pressures less than 130/80 mmHg   -Continue statin therap no Active ROM deficits were identified

## 2021-10-04 NOTE — H&P (VIEW-ONLY)
[address] NOTE      Patient Name: Dre Celaya MRN: [id number]   : 3/22/1960 CSN: 21 - began antibiotic on 3/25/15; no fever since 3/28/15   • [facility]) [facility]    per NG: \"(?assoc with vein valve dysfunction?)\" - Coumadin for 3 months   • Essential hypertension with goal blood pressure less than 140/90 12/8/2016 Blood pressure 136/74, pulse 71, temperature 98.1 °F (36.7 °C), weight 254 lb (115.2 kg), SpO2 97 %.   GEN - no distress  HEENT - normal conjunctiva, moist mucosa  Neck - Supple, no LAD  Lungs - nonlabored, clear bilaterally  Heart - JVP 14 cm H2O, caroti 09/21/2021    MARIAN 164 (H) 07/09/2016         Assessment and Recommendations:     1. Pvcs (premature ventricular contractions)  (primary encounter diagnosis)  2. Abnormal cardiovascular stress test  3. Essential hypertension  4.  Abdominal aortic aneu evaluation and treatment of CAD if indicated, as above, we will reevaluate his PVCs. He is already on propranolol, due to tremors treated by his neurologist, so I will not add or adjust his beta-blockade at this time.     He is very frustrated by the restr

## 2021-10-04 NOTE — H&P
[address] NOTE      Patient Name: Porsha Barone MRN: [id number]   : 3/22/1960 CSN: 21 - began antibiotic on 3/25/15; no fever since 3/28/15   • [facility]    per NG: \"(?assoc with vein valve dysfunction?)\" - Coumadin for 3 months   • Essential hypertension with goal blood pressure less than 140/90 12/8/2016 Blood pressure 136/74, pulse 71, temperature 98.1 °F (36.7 °C), weight 254 lb (115.2 kg), SpO2 97 %.   GEN - no distress  HEENT - normal conjunctiva, moist mucosa  Neck - Supple, no LAD  Lungs - nonlabored, clear bilaterally  Heart - JVP 14 cm H2O, caroti 09/21/2021    MARIAN 164 (H) 07/09/2016         Assessment and Recommendations:     1. Pvcs (premature ventricular contractions)  (primary encounter diagnosis)  2. Abnormal cardiovascular stress test  3. Essential hypertension  4.  Abdominal aortic aneu evaluation and treatment of CAD if indicated, as above, we will reevaluate his PVCs. He is already on propranolol, due to tremors treated by his neurologist, so I will not add or adjust his beta-blockade at this time.     He is very frustrated by the restr

## 2021-10-04 NOTE — TELEPHONE ENCOUNTER
.. Procedure Cardiac Catheteization with possible ballonangioplasty or stenting, per Dr Ken Nicole scheduled for 10/8/21  9:15am with Dr. Louisa Johnson. Betasept given to patient with instructions. Pre Admission Testing instructions were provided.   Directions to go to

## 2021-10-04 NOTE — TELEPHONE ENCOUNTER
----- Message from Nita Goldberg MD sent at 10/4/2021  9:09 AM CDT -----  Please let patient know that MRI C spine didn't show clear reason for problems with balance, however it did show degenerative changes.

## 2021-10-05 ENCOUNTER — LAB ENCOUNTER (OUTPATIENT)
Dept: LAB | Facility: HOSPITAL | Age: 61
End: 2021-10-05
Attending: INTERNAL MEDICINE
Payer: COMMERCIAL

## 2021-10-05 ENCOUNTER — HOSPITAL ENCOUNTER (OUTPATIENT)
Dept: ULTRASOUND IMAGING | Facility: HOSPITAL | Age: 61
Discharge: HOME | End: 2021-10-05
Attending: Other
Payer: COMMERCIAL

## 2021-10-05 DIAGNOSIS — R94.39 ABNORMAL STRESS TEST: ICD-10-CM

## 2021-10-05 DIAGNOSIS — I63.9 CEREBROVASCULAR ACCIDENT (CVA), UNSPECIFIED MECHANISM (HCC): ICD-10-CM

## 2021-10-05 DIAGNOSIS — Z01.818 PRE-OP TESTING: ICD-10-CM

## 2021-10-05 PROCEDURE — 93880 EXTRACRANIAL BILAT STUDY: CPT | Performed by: OTHER

## 2021-10-05 PROCEDURE — 80048 BASIC METABOLIC PNL TOTAL CA: CPT

## 2021-10-05 PROCEDURE — 93005 ELECTROCARDIOGRAM TRACING: CPT

## 2021-10-05 PROCEDURE — 85027 COMPLETE CBC AUTOMATED: CPT

## 2021-10-05 PROCEDURE — 36415 COLL VENOUS BLD VENIPUNCTURE: CPT

## 2021-10-05 PROCEDURE — 93010 ELECTROCARDIOGRAM REPORT: CPT | Performed by: INTERNAL MEDICINE

## 2021-10-06 ENCOUNTER — TELEPHONE (OUTPATIENT)
Dept: NEUROLOGY | Facility: CLINIC | Age: 61
End: 2021-10-06

## 2021-10-06 DIAGNOSIS — I65.29 STENOSIS OF CAROTID ARTERY, UNSPECIFIED LATERALITY: Primary | ICD-10-CM

## 2021-10-06 NOTE — PROGRESS NOTES
in to see pt.  No new orders Please let the patient know that doppler of carotids showed some significant stenosis (narrowing) especially in the right side. Even though this is not the reason for his strokes he should probably be evaluated by a vascular surgeon.   We can place the refer

## 2021-10-06 NOTE — TELEPHONE ENCOUNTER
----- Message from Xiomy Parks MD sent at 10/6/2021  7:59 AM CDT -----  Please let the patient know that doppler of carotids showed some significant stenosis (narrowing) especially in the right side. Even though this is not the reason for his strok

## 2021-10-06 NOTE — TELEPHONE ENCOUNTER
Called & spoke to pt who verbalized understanding. Pt verbalizes seeing multiple physicians now & has multiple appts he is trying to juggle & he is not ready to call vascular surgery just yet. Pt states out of work currently.  Pt requested referral order by

## 2021-10-08 ENCOUNTER — HOSPITAL ENCOUNTER (OUTPATIENT)
Dept: INTERVENTIONAL RADIOLOGY/VASCULAR | Facility: HOSPITAL | Age: 61
Discharge: HOME | End: 2021-10-08
Attending: INTERNAL MEDICINE | Admitting: INTERNAL MEDICINE
Payer: COMMERCIAL

## 2021-10-08 VITALS
HEIGHT: 73 IN | RESPIRATION RATE: 20 BRPM | WEIGHT: 253.5 LBS | BODY MASS INDEX: 33.6 KG/M2 | TEMPERATURE: 98 F | HEART RATE: 78 BPM | SYSTOLIC BLOOD PRESSURE: 137 MMHG | OXYGEN SATURATION: 96 % | DIASTOLIC BLOOD PRESSURE: 80 MMHG

## 2021-10-08 DIAGNOSIS — R94.39 ABNORMAL STRESS TEST: ICD-10-CM

## 2021-10-08 PROCEDURE — 36415 COLL VENOUS BLD VENIPUNCTURE: CPT

## 2021-10-08 PROCEDURE — 027034Z DILATION OF CORONARY ARTERY, ONE ARTERY WITH DRUG-ELUTING INTRALUMINAL DEVICE, PERCUTANEOUS APPROACH: ICD-10-PCS | Performed by: INTERNAL MEDICINE

## 2021-10-08 PROCEDURE — B2151ZZ FLUOROSCOPY OF LEFT HEART USING LOW OSMOLAR CONTRAST: ICD-10-PCS | Performed by: INTERNAL MEDICINE

## 2021-10-08 PROCEDURE — B2111ZZ FLUOROSCOPY OF MULTIPLE CORONARY ARTERIES USING LOW OSMOLAR CONTRAST: ICD-10-PCS | Performed by: INTERNAL MEDICINE

## 2021-10-08 PROCEDURE — 85347 COAGULATION TIME ACTIVATED: CPT

## 2021-10-08 PROCEDURE — 93458 L HRT ARTERY/VENTRICLE ANGIO: CPT

## 2021-10-08 PROCEDURE — 99152 MOD SED SAME PHYS/QHP 5/>YRS: CPT

## 2021-10-08 PROCEDURE — 4A023N7 MEASUREMENT OF CARDIAC SAMPLING AND PRESSURE, LEFT HEART, PERCUTANEOUS APPROACH: ICD-10-PCS | Performed by: INTERNAL MEDICINE

## 2021-10-08 RX ORDER — NITROGLYCERIN 20 MG/100ML
INJECTION INTRAVENOUS
Status: COMPLETED
Start: 2021-10-08 | End: 2021-10-08

## 2021-10-08 RX ORDER — PRASUGREL 10 MG/1
10 TABLET, FILM COATED ORAL DAILY
Qty: 90 TABLET | Refills: 1 | Status: SHIPPED | OUTPATIENT
Start: 2021-10-08 | End: 2021-10-08

## 2021-10-08 RX ORDER — VERAPAMIL HYDROCHLORIDE 2.5 MG/ML
INJECTION, SOLUTION INTRAVENOUS
Status: COMPLETED
Start: 2021-10-08 | End: 2021-10-08

## 2021-10-08 RX ORDER — PRASUGREL 10 MG/1
TABLET, FILM COATED ORAL
Status: COMPLETED
Start: 2021-10-08 | End: 2021-10-08

## 2021-10-08 RX ORDER — PRASUGREL 10 MG/1
10 TABLET, FILM COATED ORAL DAILY
Qty: 90 TABLET | Refills: 1 | Status: SHIPPED | OUTPATIENT
Start: 2021-10-08 | End: 2022-04-06

## 2021-10-08 RX ORDER — LIDOCAINE HYDROCHLORIDE 20 MG/ML
INJECTION, SOLUTION EPIDURAL; INFILTRATION; INTRACAUDAL; PERINEURAL
Status: COMPLETED
Start: 2021-10-08 | End: 2021-10-08

## 2021-10-08 RX ORDER — MIDAZOLAM HYDROCHLORIDE 1 MG/ML
INJECTION INTRAMUSCULAR; INTRAVENOUS
Status: COMPLETED
Start: 2021-10-08 | End: 2021-10-08

## 2021-10-08 RX ORDER — SODIUM CHLORIDE 9 MG/ML
INJECTION, SOLUTION INTRAVENOUS
Status: DISCONTINUED | OUTPATIENT
Start: 2021-10-08 | End: 2021-10-08

## 2021-10-08 RX ORDER — HEPARIN SODIUM 1000 [USP'U]/ML
INJECTION, SOLUTION INTRAVENOUS; SUBCUTANEOUS
Status: COMPLETED
Start: 2021-10-08 | End: 2021-10-08

## 2021-10-08 NOTE — IVS NOTE
Patient awake and alert x 4    VSS, CORTÉS, No P,N,V    Instructions given to patient by dietary, cardiac rehab and cath lab nursing he stated understanding of all    Right radial dressing dry, intact    Site soft, no hematoma    Placed right arm in sling and

## 2021-10-08 NOTE — INTERVAL H&P NOTE
Pre-op Diagnosis: * No pre-op diagnosis entered *    The above referenced H&P was reviewed by Karolyn Singh MD on 10/8/2021, the patient was examined and no significant changes have occurred in the patient's condition since the H&P was performed.   I discusse

## 2021-10-08 NOTE — PROCEDURES
[facility] HOSP - [facility]    Cardiac Cath Procedure Note  Lawyer Francisco Patient Status:  Outpatient in a Bed    3/22/1960 MRN [id number]   Location [facility] Attending Yohannes Gomez MD   Hosp Day # 0 PCP Kemi Falk catheterization laboratory. Patient was prepped and draped in the usual sterile fashion. Lidocaine 1% was used to infiltrate the right radial artery for local anesthesia and a 6 Khmer introducer sheath was inserted into the right radial artery.       Pascale

## 2021-10-08 NOTE — CARDIAC REHAB
Cardiac Rehab Phase I    Activity:  Distance   Assistance needed   Patient tolerated activity . Education:  Handouts provided and reviewed: [address] Diet: Healthy Cardiac diet reviewed.     Disease Process: Disease process rev

## 2021-10-08 NOTE — DIETARY NOTE
Clinical Nutrition Note    Provided Heart Healthy Nutrition Therapy handout and discussed diet recommendations for heart healthy fats, lean meats and sodium reduction with patient. Pt verbalized understanding, expect good compliance after discharge.  No fur

## 2021-11-05 ENCOUNTER — TELEPHONE (OUTPATIENT)
Dept: PHYSICAL MEDICINE AND REHAB | Facility: CLINIC | Age: 61
End: 2021-11-05

## 2021-11-15 NOTE — TELEPHONE ENCOUNTER
Placed in Dr. Domonique Reece in office bin for further review and signature. Please advise Ginna when completed to call patient for  and $25 payment.

## 2021-11-17 ENCOUNTER — TELEPHONE (OUTPATIENT)
Dept: NEUROLOGY | Facility: CLINIC | Age: 61
End: 2021-11-17

## 2021-11-17 NOTE — TELEPHONE ENCOUNTER
Paperwork usually takes 10-14 business days to complete. Paperwork is completed, just needs to be reviewed. Will call patient to let know when completed. Please sign and route message to Ginna/Keira once completed.

## 2021-11-17 NOTE — TELEPHONE ENCOUNTER
Patient calling to see if his disability paper work is ready to be picked up, he states this is urgent as the date line for this to be completed is 11/24/21 , Please fax this over to fax included on forms and advice when this is ready to be picked up, rosy w

## 2021-11-18 NOTE — TELEPHONE ENCOUNTER
S/w patient and let him know his paper work was filled out and ready to be picked up. Pt requested it be faxed to number on forms. Pt is going to  forms and understands there is a $25 fee.  Paperwork in accordion folder up front, original placed in s

## 2021-11-19 ENCOUNTER — MED REC SCAN ONLY (OUTPATIENT)
Dept: PHYSICAL MEDICINE AND REHAB | Facility: CLINIC | Age: 61
End: 2021-11-19

## 2021-12-15 ENCOUNTER — TELEPHONE (OUTPATIENT)
Dept: NEUROLOGY | Facility: CLINIC | Age: 61
End: 2021-12-15

## 2021-12-16 ENCOUNTER — TELEPHONE (OUTPATIENT)
Dept: NEUROLOGY | Facility: CLINIC | Age: 61
End: 2021-12-16

## 2021-12-16 NOTE — TELEPHONE ENCOUNTER
Pt came in to provide Disability forms to be completed by , Please fax this when completed to [phone] and call when forms are ready to be picked up. . Placed in doctors folder.

## 2021-12-22 ENCOUNTER — TELEPHONE (OUTPATIENT)
Dept: NEUROLOGY | Facility: CLINIC | Age: 61
End: 2021-12-22

## 2021-12-22 NOTE — TELEPHONE ENCOUNTER
Received LTD paperwork from [address] [place] [address]  Form filled out and placed on Dr. Mango Cuevas desk for review and signature.

## 2022-01-07 ENCOUNTER — HOSPITAL ENCOUNTER (OUTPATIENT)
Dept: CT IMAGING | Facility: HOSPITAL | Age: 62
Discharge: HOME | End: 2022-01-07
Attending: INTERNAL MEDICINE
Payer: COMMERCIAL

## 2022-01-07 DIAGNOSIS — I71.4 AAA (ABDOMINAL AORTIC ANEURYSM) WITHOUT RUPTURE (HCC): ICD-10-CM

## 2022-01-07 LAB — CREAT BLD-MCNC: 1 MG/DL

## 2022-01-07 PROCEDURE — 74174 CTA ABD&PLVS W/CONTRAST: CPT | Performed by: INTERNAL MEDICINE

## 2022-01-07 PROCEDURE — 82565 ASSAY OF CREATININE: CPT

## 2022-01-19 ENCOUNTER — TELEPHONE (OUTPATIENT)
Dept: PHYSICAL MEDICINE AND REHAB | Facility: CLINIC | Age: 62
End: 2022-01-19

## 2022-01-27 ENCOUNTER — OFFICE VISIT (OUTPATIENT)
Dept: PHYSICAL MEDICINE AND REHAB | Facility: CLINIC | Age: 62
End: 2022-01-27
Payer: COMMERCIAL

## 2022-01-27 VITALS
HEIGHT: 73 IN | DIASTOLIC BLOOD PRESSURE: 70 MMHG | OXYGEN SATURATION: 97 % | SYSTOLIC BLOOD PRESSURE: 116 MMHG | BODY MASS INDEX: 33.53 KG/M2 | WEIGHT: 253 LBS | HEART RATE: 79 BPM

## 2022-01-27 DIAGNOSIS — M48.061 SPINAL STENOSIS OF LUMBAR REGION WITHOUT NEUROGENIC CLAUDICATION: Primary | ICD-10-CM

## 2022-01-27 PROCEDURE — 99215 OFFICE O/P EST HI 40 MIN: CPT | Performed by: PHYSICAL MEDICINE & REHABILITATION

## 2022-01-27 PROCEDURE — 3078F DIAST BP <80 MM HG: CPT | Performed by: PHYSICAL MEDICINE & REHABILITATION

## 2022-01-27 PROCEDURE — 3008F BODY MASS INDEX DOCD: CPT | Performed by: PHYSICAL MEDICINE & REHABILITATION

## 2022-01-27 PROCEDURE — 3074F SYST BP LT 130 MM HG: CPT | Performed by: PHYSICAL MEDICINE & REHABILITATION

## 2022-01-27 NOTE — PROGRESS NOTES
Progress note    C/C: low back pain    HPI: 64year old male presents with chronic low back pain, nonradiating, with spasms in the left knee. History of left knee quadriceps tendon rupture. Has been off work since August of 2021.  Limited ability to bending L1-L2: Disk desiccation with bulging disk, facet, and ligamentous hypertrophy results in mild central canal narrowing and mild bilateral neural foraminal narrowing.    L2-L3: Disk desiccation with bulging disk, facet, and ligamentous hypertrophy results i ideally benefit from a chronic rehabilitative pain program such as [facility] program, or [facility] with Dr. Vladimir Henry.  If he is unable to do this then outpatient physical therapy might help him at least maintain his current level o

## 2022-03-02 RX ORDER — LOSARTAN POTASSIUM AND HYDROCHLOROTHIAZIDE 25; 100 MG/1; MG/1
1 TABLET ORAL DAILY
Qty: 90 TABLET | Refills: 0 | Status: SHIPPED | OUTPATIENT
Start: 2022-03-02 | End: 2022-09-02

## 2022-03-02 NOTE — TELEPHONE ENCOUNTER
Refill passed per [facility] protocol.     Requested Prescriptions   Pending Prescriptions Disp Refills    LOSARTAN-HYDROCHLOROTHIAZIDE 100-25 MG Oral Tab [Pharmacy Med Name: losartan 100 mg-hydrochlorothiazide 25 mg tablet] 90 tablet 1     Sig: TAKE 1 TABLET BY MOUTH DAILY        Hypertensive Medications Protocol Failed - 3/2/2022  4:58 PM        Failed - Appointment in past 6 or next 3 months        Passed - CMP or BMP in past 12 months        Passed - GFR Non- > 50     Lab Results   Component Value Date    GFRNAA 81 01/07/2022                           Recent Outpatient Visits              1 month ago L3-4 central and bilateral foraminal stenosis    [facility] [place]-Phys[facility] Ernie Murray DO    Office Visit    4 months ago Abnormal stress test    [facility]    Nurse Only    4 months ago Abnormal stress test    [facility]    Nurse Only    4 months ago PVCs (premature ventricular contractions)    [facility], Rosalva Casanova MD    Office Visit    5 months ago Essential tremor    ZAY Barboza MD    Office Visit

## 2022-03-25 RX ORDER — AMLODIPINE BESYLATE 10 MG/1
10 TABLET ORAL DAILY
Qty: 90 TABLET | Refills: 0 | Status: SHIPPED | OUTPATIENT
Start: 2022-03-25 | End: 2022-10-11

## 2022-03-25 NOTE — TELEPHONE ENCOUNTER
Refill passed per [facility] protocol.    Requested Prescriptions   Pending Prescriptions Disp Refills    AMLODIPINE 10 MG Oral Tab [Pharmacy Med Name: amlodipine 10 mg tablet] 90 tablet 1     Sig: TAKE 1 TABLET BY MOUTH DAILY        Hypertensive Medications Protocol Failed - 3/25/2022  3:40 PM        Failed - Appointment in past 6 or next 3 months        Passed - CMP or BMP in past 12 months        Passed - GFR Non- > 50     Lab Results   Component Value Date    GFRNAA 81 01/07/2022                     Recent Outpatient Visits              1 month ago L3-4 central and bilateral foraminal stenosis    [address] [facility]-Phys[facility] Zac Ramey DO    Office Visit    5 months ago Abnormal stress test    [facility]    Nurse Only    5 months ago Abnormal stress test    [facility]    Nurse Only    5 months ago PVCs (premature ventricular contractions)    [address] [facility], Mariana Felton MD    Office Visit    6 months ago Essential tremor    ZAY Pineda MD    Office Visit

## 2022-03-25 NOTE — TELEPHONE ENCOUNTER
Refill passed per [facility] protocol.    Requested Prescriptions   Pending Prescriptions Disp Refills    AMLODIPINE 10 MG Oral Tab [Pharmacy Med Name: amlodipine 10 mg tablet] 90 tablet 1     Sig: TAKE 1 TABLET BY MOUTH DAILY        Hypertensive Medications Protocol Failed - 3/25/2022  3:40 PM        Failed - Appointment in past 6 or next 3 months        Passed - CMP or BMP in past 12 months        Passed - GFR Non- > 50     Lab Results   Component Value Date    GFRNAA 81 01/07/2022                     Recent Outpatient Visits              1 month ago L3-4 central and bilateral foraminal stenosis    [facility]    Office Visit    5 months ago Abnormal stress test    [facility]    Nurse Only    5 months ago Abnormal stress test    [facility]    Nurse Only    5 months ago PVCs (premature ventricular contractions)    [facility], Maggi Obrien MD    Office Visit    6 months ago Essential tremor    ZAY Kohler MD    Office Visit

## 2022-04-19 ENCOUNTER — TELEPHONE (OUTPATIENT)
Dept: INTERNAL MEDICINE CLINIC | Facility: CLINIC | Age: 62
End: 2022-04-19

## 2022-06-20 ENCOUNTER — TELEPHONE (OUTPATIENT)
Dept: INTERNAL MEDICINE CLINIC | Facility: CLINIC | Age: 62
End: 2022-06-20

## 2022-06-20 NOTE — TELEPHONE ENCOUNTER
Patient received a call last week about scheduling an MRI or ultrasound for a lung nodule that was found during recent cardiology  testing. Per patient the call was from Dr. Josefa Spatz office. Patient called to schedule the appointment but was informed no order was in the system. Patient is requesting a call back to verify what testing is needed.

## 2022-06-27 NOTE — TELEPHONE ENCOUNTER
Please have patient make an appointment with Dr. Areli Chaudhary. He is due.     GRETA Lawson  Working with [facility]

## 2022-08-08 ENCOUNTER — OFFICE VISIT (OUTPATIENT)
Dept: INTERNAL MEDICINE CLINIC | Facility: CLINIC | Age: 62
End: 2022-08-08
Payer: COMMERCIAL

## 2022-08-08 VITALS
TEMPERATURE: 97 F | SYSTOLIC BLOOD PRESSURE: 116 MMHG | BODY MASS INDEX: 34.19 KG/M2 | DIASTOLIC BLOOD PRESSURE: 58 MMHG | RESPIRATION RATE: 20 BRPM | HEIGHT: 73 IN | HEART RATE: 68 BPM | WEIGHT: 258 LBS

## 2022-08-08 DIAGNOSIS — Z12.5 SCREENING FOR PROSTATE CANCER: ICD-10-CM

## 2022-08-08 DIAGNOSIS — M51.9 LUMBAR DISC DISEASE: ICD-10-CM

## 2022-08-08 DIAGNOSIS — R91.1 PULMONARY NODULE: ICD-10-CM

## 2022-08-08 DIAGNOSIS — G25.0 ESSENTIAL TREMOR: ICD-10-CM

## 2022-08-08 DIAGNOSIS — F17.200 TOBACCO USE DISORDER: ICD-10-CM

## 2022-08-08 DIAGNOSIS — I10 ESSENTIAL HYPERTENSION WITH GOAL BLOOD PRESSURE LESS THAN 140/90: Primary | ICD-10-CM

## 2022-08-08 PROCEDURE — 3074F SYST BP LT 130 MM HG: CPT | Performed by: INTERNAL MEDICINE

## 2022-08-08 PROCEDURE — 3008F BODY MASS INDEX DOCD: CPT | Performed by: INTERNAL MEDICINE

## 2022-08-08 PROCEDURE — 3078F DIAST BP <80 MM HG: CPT | Performed by: INTERNAL MEDICINE

## 2022-08-08 PROCEDURE — 99214 OFFICE O/P EST MOD 30 MIN: CPT | Performed by: INTERNAL MEDICINE

## 2022-08-08 RX ORDER — DULOXETIN HYDROCHLORIDE 30 MG/1
CAPSULE, DELAYED RELEASE ORAL
COMMUNITY
Start: 2021-10-18

## 2022-08-08 RX ORDER — PRASUGREL 10 MG/1
10 TABLET, FILM COATED ORAL DAILY
COMMUNITY
Start: 2022-07-27

## 2022-08-21 ENCOUNTER — TELEPHONE (OUTPATIENT)
Dept: INTERNAL MEDICINE CLINIC | Facility: CLINIC | Age: 62
End: 2022-08-21

## 2022-08-21 NOTE — TELEPHONE ENCOUNTER
Please call patient. This to follow-up on her recent office visit and need to evaluate the CT scan done at outside institution. I did get the results. I compared them to the previous study. Please go over my impression as stated below:    CT scan of the abdomen pelvis done in January and ordered by the cardiologist showed the continued existence of the abdominal aortic aneurysm. Also showed right and left-sided diverticulosis. There were some questionable changes in the bladder that may suggest a cystitis versus other benign changes. He had 6 mm pulmonary nodule in the right lower lobe. This would been stable since August 2021. Patient had chest CT scan done on July 22 at outside medical imaging center. This study was not compared to any other study by the radiologist.  The impression was \"multiple bilateral pulmonary nodules largest measuring 4 mm in the right lower lobe. \". Given these findings and the lack of size increase from 1 study to the next, no further testing is required right now. He is a long-term but light smoker. We should repeat the study in 12 months.

## 2022-09-02 RX ORDER — LOSARTAN POTASSIUM AND HYDROCHLOROTHIAZIDE 25; 100 MG/1; MG/1
1 TABLET ORAL DAILY
Qty: 90 TABLET | Refills: 1 | Status: SHIPPED | OUTPATIENT
Start: 2022-09-02

## 2022-10-11 RX ORDER — AMLODIPINE BESYLATE 10 MG/1
10 TABLET ORAL DAILY
Qty: 90 TABLET | Refills: 1 | Status: SHIPPED | OUTPATIENT
Start: 2022-10-11

## 2022-10-11 NOTE — TELEPHONE ENCOUNTER
Please review. Protocol failed or has no protocol. Requested Prescriptions   Pending Prescriptions Disp Refills    AMLODIPINE 10 MG Oral Tab [Pharmacy Med Name: amlodipine 10 mg tablet] 90 tablet 1     Sig: Take 1 tablet (10 mg total) by mouth daily. Hypertensive Medications Protocol Failed - 10/11/2022  4:23 PM        Failed - CMP or BMP in past 6 months     No results found for this or any previous visit (from the past 4392 hour(s)).               Passed - In person appointment in the past 12 or next 3 months       Recent Outpatient Visits              2 months ago Essential hypertension with goal blood pressure less than 140/90    [place] [facility], [address], Elaine Matta MD    Office Visit    8 months ago L3-4 central and bilateral foraminal stenosis    [facility]    Office Visit    1 year ago Abnormal stress test    [facility]    Nurse Only    1 year ago Abnormal stress test    [place] [facility]    Nurse Only    1 year ago PVCs (premature ventricular contractions)    [facility]    Office Visit     Future Appointments         Provider Department Appt Notes    In 4 months Paul Steinberg MD [facility] 6 mo fu               Passed - Last BP reading less than 140/90     BP Readings from Last 1 Encounters:  08/08/22 : 116/58                Passed - In person appointment or virtual visit in the past 6 months       Recent Outpatient Visits              2 months ago Essential hypertension with goal blood pressure less than 140/90    [place] [facility], [address], Elaine Matta MD    Office Visit    8 months ago L3-4 central and bilateral foraminal stenosis    [address] [place], [place]    Office Visit    1 year ago Abnormal stress test [facility]    Nurse Only    1 year ago Abnormal stress test    [facility]    Nurse Only    1 year ago PVCs (premature ventricular contractions)    [facility] Florinda Ontiveros MD    Office Visit     Future Appointments         Provider Department Appt Notes    In 4 months Glo Conroy MD [facility], Jazzy Valdes 6 mo fu               Passed - [facility] or GFRNAA > 50     GFR Evaluation  GFRNAA: 81 , resulted on 1/7/2022                  Recent Outpatient Visits              2 months ago Essential hypertension with goal blood pressure less than 140/90    [facility], [address], Vivian Matta MD    Office Visit    8 months ago L3-4 central and bilateral foraminal stenosis    [address] [facility] Evangelina Velez DO    Office Visit    1 year ago Abnormal stress test    [facility]    Nurse Only    1 year ago Abnormal stress test    [facility]    Nurse Only    1 year ago PVCs (premature ventricular contractions)    [facility], Florinda Navarrtee MD    Office Visit            Future Appointments         Provider Department Appt Notes    In 4 months Glo Conroy MD [employer] [employer], [place]zy [place] 6 mo fu

## 2022-11-09 RX ORDER — ROSUVASTATIN CALCIUM 40 MG/1
TABLET, COATED ORAL
Qty: 30 TABLET | Refills: 0 | Status: SHIPPED | OUTPATIENT
Start: 2022-11-09

## 2022-11-09 NOTE — TELEPHONE ENCOUNTER
Refill request for rosuvastatin 40 mg, take 1 tab nightly, #30, no refills    LOV: 9/21/21  NOV: None

## 2022-11-29 ENCOUNTER — TELEPHONE (OUTPATIENT)
Dept: INTERNAL MEDICINE CLINIC | Facility: CLINIC | Age: 62
End: 2022-11-29

## 2022-11-29 NOTE — TELEPHONE ENCOUNTER
Patient has [employer], Insurance will no longer be accepted as of 1/1/2023. Patient contacted and informed, states that he is currently on disability and that's why he has [employer] but states starting January 1 2023 he will have [facility] and will keep scheduled appointment with Dr Cesar Limon on 2/12/23.

## 2022-12-12 ENCOUNTER — TELEPHONE (OUTPATIENT)
Dept: CASE MANAGEMENT | Age: 62
End: 2022-12-12

## 2022-12-12 NOTE — TELEPHONE ENCOUNTER
Dr. Rebeka Orozco,    The patient wants to have his labs done tomorrow, 12/13/22 and his insurance is requesting additional information from his PCP. Please call patient to discuss. Thank you.

## 2022-12-13 ENCOUNTER — OFFICE VISIT (OUTPATIENT)
Dept: NEUROLOGY | Facility: CLINIC | Age: 62
End: 2022-12-13
Payer: COMMERCIAL

## 2022-12-13 VITALS
BODY MASS INDEX: 32.73 KG/M2 | HEART RATE: 68 BPM | WEIGHT: 255 LBS | DIASTOLIC BLOOD PRESSURE: 72 MMHG | SYSTOLIC BLOOD PRESSURE: 122 MMHG | HEIGHT: 74 IN

## 2022-12-13 DIAGNOSIS — G25.0 ESSENTIAL TREMOR: Primary | ICD-10-CM

## 2022-12-13 PROCEDURE — 99214 OFFICE O/P EST MOD 30 MIN: CPT | Performed by: OTHER

## 2022-12-13 PROCEDURE — 3074F SYST BP LT 130 MM HG: CPT | Performed by: OTHER

## 2022-12-13 PROCEDURE — 3078F DIAST BP <80 MM HG: CPT | Performed by: OTHER

## 2022-12-13 PROCEDURE — 3008F BODY MASS INDEX DOCD: CPT | Performed by: OTHER

## 2022-12-13 RX ORDER — PROPRANOLOL HYDROCHLORIDE 60 MG/1
TABLET ORAL
Qty: 180 TABLET | Refills: 3 | Status: CANCELLED | OUTPATIENT
Start: 2022-12-13

## 2022-12-13 RX ORDER — PROPRANOLOL HYDROCHLORIDE 160 MG/1
160 CAPSULE, EXTENDED RELEASE ORAL DAILY
Qty: 90 CAPSULE | Refills: 3 | Status: SHIPPED | OUTPATIENT
Start: 2022-12-13

## 2022-12-13 NOTE — TELEPHONE ENCOUNTER
I spoke with the patient. He says that liss is not covering the blood test because there is no order out there. I advised the patient that there in fact is an order out there that was done on August 8. He request that we contact [employer] and let them know about the existence of the order and to find out whether they will cover the tests. He suggest we call member and provider services at phone number [phone]. Please call the number and see if we can get this straightened out.

## 2022-12-14 NOTE — TELEPHONE ENCOUNTER
CONTACTED [facility], SPOKE TO Kerry Seek REFERENCE # [id number] STATES THAT PATIENT CURRENT INSURANCE IS CURRENT WITH START DATE 22 AND IS COVERED FOR LAB TESTING AT [facility] WITH $15 CO-PAY. FASTING LABS ORDERED BY DR Ebony Spencer PLACED ON 22 AND WILL  ON 23. PATIENT CONTACTED AND INFORMED, WAS CONFUSED WHY [facility] DID NOT HAVE THE ORDERS, INFORMED HIM THAT ORDERS WHERE SENT TO [facility] NOT INSURANCE CO.

## 2022-12-14 NOTE — TELEPHONE ENCOUNTER
Unable to reach [facility]. Please have staff reach out to Tita to find out what is needed. If the lab is not covered then the patient will need to pay out of pocket. Thank you.

## 2022-12-22 ENCOUNTER — LAB ENCOUNTER (OUTPATIENT)
Dept: LAB | Facility: HOSPITAL | Age: 62
End: 2022-12-22
Attending: INTERNAL MEDICINE
Payer: COMMERCIAL

## 2022-12-22 DIAGNOSIS — I10 ESSENTIAL HYPERTENSION WITH GOAL BLOOD PRESSURE LESS THAN 140/90: ICD-10-CM

## 2022-12-22 DIAGNOSIS — Z12.5 SCREENING FOR PROSTATE CANCER: ICD-10-CM

## 2022-12-22 LAB
ALBUMIN SERPL-MCNC: 4.4 G/DL (ref 3.4–5)
ALBUMIN/GLOB SERPL: 1.5 {RATIO} (ref 1–2)
ALP LIVER SERPL-CCNC: 59 U/L
ALT SERPL-CCNC: 45 U/L
ANION GAP SERPL CALC-SCNC: 6 MMOL/L (ref 0–18)
AST SERPL-CCNC: 17 U/L (ref 15–37)
BASOPHILS # BLD AUTO: 0.11 X10(3) UL (ref 0–0.2)
BASOPHILS NFR BLD AUTO: 1.3 %
BILIRUB SERPL-MCNC: 0.6 MG/DL (ref 0.1–2)
BUN BLD-MCNC: 14 MG/DL (ref 7–18)
BUN/CREAT SERPL: 13.9 (ref 10–20)
CALCIUM BLD-MCNC: 9.3 MG/DL (ref 8.5–10.1)
CHLORIDE SERPL-SCNC: 105 MMOL/L (ref 98–112)
CHOLEST SERPL-MCNC: 108 MG/DL (ref ?–200)
CO2 SERPL-SCNC: 30 MMOL/L (ref 21–32)
COMPLEXED PSA SERPL-MCNC: 2.46 NG/ML (ref ?–4)
CREAT BLD-MCNC: 1.01 MG/DL
DEPRECATED RDW RBC AUTO: 41.1 FL (ref 35.1–46.3)
EOSINOPHIL # BLD AUTO: 0.24 X10(3) UL (ref 0–0.7)
EOSINOPHIL NFR BLD AUTO: 2.8 %
ERYTHROCYTE [DISTWIDTH] IN BLOOD BY AUTOMATED COUNT: 12 % (ref 11–15)
FASTING PATIENT LIPID ANSWER: YES
FASTING STATUS PATIENT QL REPORTED: YES
GFR SERPLBLD BASED ON 1.73 SQ M-ARVRAT: 84 ML/MIN/1.73M2 (ref 60–?)
GLOBULIN PLAS-MCNC: 3 G/DL (ref 2.8–4.4)
GLUCOSE BLD-MCNC: 114 MG/DL (ref 70–99)
HCT VFR BLD AUTO: 40 %
HDLC SERPL-MCNC: 33 MG/DL (ref 40–59)
HGB BLD-MCNC: 14.3 G/DL
IMM GRANULOCYTES # BLD AUTO: 0.03 X10(3) UL (ref 0–1)
IMM GRANULOCYTES NFR BLD: 0.3 %
LDLC SERPL CALC-MCNC: 43 MG/DL (ref ?–100)
LYMPHOCYTES # BLD AUTO: 1.91 X10(3) UL (ref 1–4)
LYMPHOCYTES NFR BLD AUTO: 22.1 %
MCH RBC QN AUTO: 33 PG (ref 26–34)
MCHC RBC AUTO-ENTMCNC: 35.8 G/DL (ref 31–37)
MCV RBC AUTO: 92.4 FL
MONOCYTES # BLD AUTO: 0.93 X10(3) UL (ref 0.1–1)
MONOCYTES NFR BLD AUTO: 10.8 %
NEUTROPHILS # BLD AUTO: 5.42 X10 (3) UL (ref 1.5–7.7)
NEUTROPHILS # BLD AUTO: 5.42 X10(3) UL (ref 1.5–7.7)
NEUTROPHILS NFR BLD AUTO: 62.7 %
NONHDLC SERPL-MCNC: 75 MG/DL (ref ?–130)
OSMOLALITY SERPL CALC.SUM OF ELEC: 293 MOSM/KG (ref 275–295)
PLATELET # BLD AUTO: 205 10(3)UL (ref 150–450)
POTASSIUM SERPL-SCNC: 3.9 MMOL/L (ref 3.5–5.1)
PROT SERPL-MCNC: 7.4 G/DL (ref 6.4–8.2)
RBC # BLD AUTO: 4.33 X10(6)UL
SODIUM SERPL-SCNC: 141 MMOL/L (ref 136–145)
T4 FREE SERPL-MCNC: 0.8 NG/DL (ref 0.8–1.7)
TRIGL SERPL-MCNC: 197 MG/DL (ref 30–149)
TSI SER-ACNC: 6.15 MIU/ML (ref 0.36–3.74)
VLDLC SERPL CALC-MCNC: 28 MG/DL (ref 0–30)
WBC # BLD AUTO: 8.6 X10(3) UL (ref 4–11)

## 2022-12-22 PROCEDURE — 36415 COLL VENOUS BLD VENIPUNCTURE: CPT

## 2022-12-22 PROCEDURE — 80061 LIPID PANEL: CPT

## 2022-12-22 PROCEDURE — 84439 ASSAY OF FREE THYROXINE: CPT

## 2022-12-22 PROCEDURE — 84443 ASSAY THYROID STIM HORMONE: CPT

## 2022-12-22 PROCEDURE — 85025 COMPLETE CBC W/AUTO DIFF WBC: CPT

## 2022-12-22 PROCEDURE — 80053 COMPREHEN METABOLIC PANEL: CPT

## 2023-02-14 RX ORDER — LOSARTAN POTASSIUM AND HYDROCHLOROTHIAZIDE 25; 100 MG/1; MG/1
1 TABLET ORAL DAILY
Qty: 90 TABLET | Refills: 1 | Status: SHIPPED | OUTPATIENT
Start: 2023-02-14

## 2023-02-15 ENCOUNTER — TELEPHONE (OUTPATIENT)
Dept: INTERNAL MEDICINE CLINIC | Facility: CLINIC | Age: 63
End: 2023-02-15

## 2023-02-15 ENCOUNTER — TELEPHONE (OUTPATIENT)
Dept: CASE MANAGEMENT | Age: 63
End: 2023-02-15

## 2023-02-15 DIAGNOSIS — I71.40 ABDOMINAL AORTIC ANEURYSM (AAA) WITHOUT RUPTURE, UNSPECIFIED PART (HCC): Primary | ICD-10-CM

## 2023-02-15 DIAGNOSIS — I25.10 CORONARY ARTERY DISEASE INVOLVING NATIVE CORONARY ARTERY OF NATIVE HEART, UNSPECIFIED WHETHER ANGINA PRESENT: ICD-10-CM

## 2023-02-15 DIAGNOSIS — Z11.52 ENCOUNTER FOR SCREENING FOR COVID-19: ICD-10-CM

## 2023-02-15 NOTE — TELEPHONE ENCOUNTER
Dr. Del Blue,     The patient has new insurance and he would like to have that updated with the office. Please have staff call patient to update insurance,     Thank you. Ana Paula Velasco

## 2023-02-18 NOTE — TELEPHONE ENCOUNTER
I believe that the patient is not correct about this. Typically, I do not need to re-generate orders done by a specialist that the patient saw.  Nonetheless, I have signed the orders

## 2023-03-04 NOTE — TELEPHONE ENCOUNTER
Call pt. Ok to place in SDS slot or any other open slot. I have reviewed the MRI and it is okay for the patient to wait a week or so to be seen. Courtesy call to the [facility] made to notify that patient is en route to their facility.       Adrien Antoine RN  03/04/23 [id number]

## 2023-03-17 NOTE — TELEPHONE ENCOUNTER
Last office visit 8/8/22. Future Appointments   Date Time Provider Minh Henderson   4/24/2023  4:40 PM Chata Whipple MD [facility]             Please review; protocol failed/no protocol.      Requested Prescriptions   Pending Prescriptions Disp Refills    AMLODIPINE 10 MG Oral Tab [Pharmacy Med Name: amlodipine 10 mg tablet] 90 tablet 1     Sig: TAKE 1 TABLET BY MOUTH DAILY       Hypertensive Medications Protocol Failed - 3/17/2023  3:46 PM        Failed - In person appointment or virtual visit in the past 6 months     Recent Outpatient Visits              3 months ago Essential tremor    [facility], Edgar Dakin, MD    Office Visit    7 months ago Essential hypertension with goal blood pressure less than 140/90    [facility], [address] [facility],[address], Niya Matta MD    Office Visit    1 year ago L3-4 central and bilateral foraminal stenosis    [facility], [address] [facility],[address], Paxton Ruiz DO    Office Visit    1 year ago Abnormal stress test    [address], [place], [employer]    Nurse Only    1 year ago Abnormal stress test    [address], [place], [employer]    Nurse Only          Future Appointments         Provider Department Appt Notes    In 1 month Chata Whipple MD [address], [facility] 6 mo fu, informed of policy               Passed - In person appointment in the past 12 or next 3 months     Recent Outpatient Visits              3 months ago Essential tremor    [address], [address] [facility],[address], Edgar Dakin, MD    Office Visit    7 months ago Essential hypertension with goal blood pressure less than 140/90    [address] [place] [address], Coby Brunner, MD    Office Visit    1 year ago L3-4 central and bilateral foraminal stenosis    [facility], [address] [facility],[address] [facility], [facility] Shannon Hicks DO    Office Visit    1 year ago Abnormal stress test    [facility] [facility], [facility]    Nurse Only    1 year ago Abnormal stress test    [address], [place] [facility]    Nurse Only          Future Appointments         Provider Department Appt Notes    In 1 month [facility] [place] [address] [facility] 6 mo fu, informed of policy               Passed - Last BP reading less than 140/90     BP Readings from Last 1 Encounters:  12/13/22 : 122/72              Passed - CMP or BMP in past 6 months     Recent Results (from the past 4392 hour(s))   COMP METABOLIC PANEL (14)    Collection Time: 12/22/22  9:16 AM   Result Value Ref Range    Glucose 114 (H) 70 - 99 mg/dL    Sodium 141 136 - 145 mmol/L    Potassium 3.9 3.5 - 5.1 mmol/L    Chloride 105 98 - 112 mmol/L    CO2 30.0 21.0 - 32.0 mmol/L    Anion Gap 6 0 - 18 mmol/L    BUN 14 7 - 18 mg/dL    Creatinine 1.01 0.70 - 1.30 mg/dL    BUN/CREA Ratio 13.9 10.0 - 20.0    Calcium, Total 9.3 8.5 - 10.1 mg/dL    Calculated Osmolality 293 275 - 295 mOsm/kg    eGFR-Cr 84 >=60 mL/min/1.73m2    ALT 45 16 - 61 U/L    AST 17 15 - 37 U/L    Alkaline Phosphatase 59 45 - 117 U/L    Bilirubin, Total 0.6 0.1 - 2.0 mg/dL    Total Protein 7.4 6.4 - 8.2 g/dL    Albumin 4.4 3.4 - 5.0 g/dL    Globulin  3.0 2.8 - 4.4 g/dL    A/G Ratio 1.5 1.0 - 2.0    Patient Fasting for CMP? Yes      *Note: Due to a large number of results and/or encounters for the requested time period, some results have not been displayed. A complete set of results can be found in Results Review.                Passed - EGFRCR or GFRNAA > 50     GFR Evaluation  EGFRCR: 84 , resulted on 12/22/2022                Recent Outpatient Visits              3 months ago Essential tremor    [address] [facility], Aydin Huizar MD    Office Visit    7 months ago Essential hypertension with goal blood pressure less than 140/90    [facility], [address], Lidya Matta MD    Office Visit    1 year ago L3-4 central and bilateral foraminal stenosis    [facility], [address], Paxton Ruiz DO    Office Visit    1 year ago Abnormal stress test    [facility] [place]    Nurse Only    1 year ago Abnormal stress test    [facility] [address] Only             Future Appointments         Provider Department Appt Notes    In 1 month Theodor Brittle, MD [address], [address] [facility], [place], [place] 6 mo fu, informed of policy

## 2023-03-20 RX ORDER — AMLODIPINE BESYLATE 10 MG/1
10 TABLET ORAL DAILY
Qty: 90 TABLET | Refills: 3 | Status: SHIPPED | OUTPATIENT
Start: 2023-03-20

## 2023-04-24 ENCOUNTER — OFFICE VISIT (OUTPATIENT)
Facility: CLINIC | Age: 63
End: 2023-04-24

## 2023-04-24 VITALS
TEMPERATURE: 98 F | SYSTOLIC BLOOD PRESSURE: 122 MMHG | HEART RATE: 70 BPM | WEIGHT: 260 LBS | BODY MASS INDEX: 33.37 KG/M2 | HEIGHT: 74 IN | RESPIRATION RATE: 20 BRPM | DIASTOLIC BLOOD PRESSURE: 70 MMHG

## 2023-04-24 DIAGNOSIS — G25.0 ESSENTIAL TREMOR: ICD-10-CM

## 2023-04-24 DIAGNOSIS — M51.9 LUMBAR DISC DISEASE: ICD-10-CM

## 2023-04-24 DIAGNOSIS — F17.200 TOBACCO USE DISORDER: ICD-10-CM

## 2023-04-24 DIAGNOSIS — R91.1 PULMONARY NODULE: ICD-10-CM

## 2023-04-24 DIAGNOSIS — I10 ESSENTIAL HYPERTENSION WITH GOAL BLOOD PRESSURE LESS THAN 140/90: Primary | ICD-10-CM

## 2023-04-24 DIAGNOSIS — I25.10 CORONARY ARTERY DISEASE INVOLVING NATIVE CORONARY ARTERY OF NATIVE HEART, UNSPECIFIED WHETHER ANGINA PRESENT: ICD-10-CM

## 2023-04-24 DIAGNOSIS — I71.40 ABDOMINAL AORTIC ANEURYSM (AAA) WITHOUT RUPTURE, UNSPECIFIED PART (HCC): ICD-10-CM

## 2023-04-24 RX ORDER — MAGNESIUM HYDROXIDE 400 MG/5ML
SUSPENSION, ORAL (FINAL DOSE FORM) ORAL
COMMUNITY
Start: 2021-10-18 | End: 2023-04-24

## 2023-04-24 RX ORDER — CLOPIDOGREL BISULFATE 75 MG/1
75 TABLET ORAL DAILY
COMMUNITY
Start: 2023-04-15

## 2023-06-07 ENCOUNTER — LAB ENCOUNTER (OUTPATIENT)
Dept: LAB | Facility: HOSPITAL | Age: 63
End: 2023-06-07
Attending: EMERGENCY MEDICINE
Payer: COMMERCIAL

## 2023-06-07 DIAGNOSIS — I10 ESSENTIAL HYPERTENSION, MALIGNANT: ICD-10-CM

## 2023-06-07 DIAGNOSIS — I25.10 CORONARY ATHEROSCLEROSIS OF NATIVE CORONARY ARTERY: ICD-10-CM

## 2023-06-07 DIAGNOSIS — E78.00 PURE HYPERCHOLESTEROLEMIA: Primary | ICD-10-CM

## 2023-06-07 LAB
ALT SERPL-CCNC: 44 U/L
ANION GAP SERPL CALC-SCNC: 7 MMOL/L (ref 0–18)
AST SERPL-CCNC: 20 U/L (ref 15–37)
BUN BLD-MCNC: 16 MG/DL (ref 7–18)
BUN/CREAT SERPL: 16 (ref 10–20)
CALCIUM BLD-MCNC: 9.4 MG/DL (ref 8.5–10.1)
CHLORIDE SERPL-SCNC: 105 MMOL/L (ref 98–112)
CHOLEST SERPL-MCNC: 115 MG/DL (ref ?–200)
CO2 SERPL-SCNC: 29 MMOL/L (ref 21–32)
CREAT BLD-MCNC: 1 MG/DL
FASTING PATIENT LIPID ANSWER: YES
FASTING STATUS PATIENT QL REPORTED: YES
GFR SERPLBLD BASED ON 1.73 SQ M-ARVRAT: 85 ML/MIN/1.73M2 (ref 60–?)
GLUCOSE BLD-MCNC: 119 MG/DL (ref 70–99)
HDLC SERPL-MCNC: 36 MG/DL (ref 40–59)
LDLC SERPL CALC-MCNC: 47 MG/DL (ref ?–100)
NONHDLC SERPL-MCNC: 79 MG/DL (ref ?–130)
OSMOLALITY SERPL CALC.SUM OF ELEC: 294 MOSM/KG (ref 275–295)
POTASSIUM SERPL-SCNC: 3.8 MMOL/L (ref 3.5–5.1)
SODIUM SERPL-SCNC: 141 MMOL/L (ref 136–145)
TRIGL SERPL-MCNC: 194 MG/DL (ref 30–149)
VLDLC SERPL CALC-MCNC: 27 MG/DL (ref 0–30)

## 2023-06-07 PROCEDURE — 80061 LIPID PANEL: CPT

## 2023-06-07 PROCEDURE — 80048 BASIC METABOLIC PNL TOTAL CA: CPT

## 2023-06-07 PROCEDURE — 84450 TRANSFERASE (AST) (SGOT): CPT

## 2023-06-07 PROCEDURE — 84460 ALANINE AMINO (ALT) (SGPT): CPT

## 2023-06-07 PROCEDURE — 36415 COLL VENOUS BLD VENIPUNCTURE: CPT

## 2023-06-15 ENCOUNTER — TELEPHONE (OUTPATIENT)
Facility: CLINIC | Age: 63
End: 2023-06-15

## 2023-06-15 NOTE — TELEPHONE ENCOUNTER
Disability insurance benefit paperwork received via mail at [address] [place] [address] location from [employer]. E-mail scanned to forms department.

## 2023-06-27 NOTE — TELEPHONE ENCOUNTER
Spoke to pt, details obtained. Pt states the disab company is requesting paperwork for both issues. Pt states heart issue was found in 2021, but it was never asked about for his disability forms until recently. Type of Leave: LTD  Reason for Leave: Heart condition (new disability forms), Back & leg issues (original disability forms)  Start date of leave:  Both issues found in 2021  How much time needed?: Long term    Forms Due Date: Unknown  Was Fee and Turnaround info Given?: Yes

## 2023-06-28 NOTE — TELEPHONE ENCOUNTER
Dr. Del Blue,    Pt is seeking long term disability for heart issue he was diagnosed with in 2021 and backand leg issues. Do you support long term disability for the patient? Thank you,    Rosalina Rockwell.

## 2023-06-29 NOTE — TELEPHONE ENCOUNTER
Any statement of disability regarding his heart issue would have to come from his cardiologist and not from our office. He does have chronic back problems and has been disabled from that for a while. If we have fill these forms out before, it is okay to go ahead and fill them out again. This is only in regards to the back issue and not the heart issue.

## 2023-08-05 ENCOUNTER — TELEPHONE (OUTPATIENT)
Dept: INTERNAL MEDICINE CLINIC | Facility: CLINIC | Age: 63
End: 2023-08-05

## 2023-08-05 NOTE — TELEPHONE ENCOUNTER
[employer] FORMS RECEIVED VIA FAX [employer] FROM [employer] AND SENT TO FORMS

## 2023-08-07 NOTE — TELEPHONE ENCOUNTER
Refer to telephone Encounter, 6/15/23. .. Disab forms to be completed by Cardiologist. Cld pt. To inform where forms need to be sent to.

## 2023-08-29 RX ORDER — LOSARTAN POTASSIUM AND HYDROCHLOROTHIAZIDE 25; 100 MG/1; MG/1
1 TABLET ORAL DAILY
Qty: 90 TABLET | Refills: 3 | Status: SHIPPED | OUTPATIENT
Start: 2023-08-29

## 2023-09-26 ENCOUNTER — LAB ENCOUNTER (OUTPATIENT)
Dept: LAB | Age: 63
End: 2023-09-26
Attending: INTERNAL MEDICINE
Payer: COMMERCIAL

## 2023-09-26 DIAGNOSIS — I25.10 CORONARY ARTERY DISEASE INVOLVING NATIVE CORONARY ARTERY OF NATIVE HEART WITHOUT ANGINA PECTORIS: ICD-10-CM

## 2023-09-26 DIAGNOSIS — E78.00 PURE HYPERCHOLESTEROLEMIA: Primary | ICD-10-CM

## 2023-09-26 DIAGNOSIS — I10 ESSENTIAL HYPERTENSION: ICD-10-CM

## 2023-09-26 LAB
ANION GAP SERPL CALC-SCNC: 7 MMOL/L (ref 0–18)
BUN BLD-MCNC: 12 MG/DL (ref 7–18)
BUN/CREAT SERPL: 12 (ref 10–20)
CALCIUM BLD-MCNC: 9.8 MG/DL (ref 8.5–10.1)
CHLORIDE SERPL-SCNC: 105 MMOL/L (ref 98–112)
CO2 SERPL-SCNC: 28 MMOL/L (ref 21–32)
CREAT BLD-MCNC: 1 MG/DL
EGFRCR SERPLBLD CKD-EPI 2021: 85 ML/MIN/1.73M2 (ref 60–?)
FASTING STATUS PATIENT QL REPORTED: NO
GLUCOSE BLD-MCNC: 142 MG/DL (ref 70–99)
OSMOLALITY SERPL CALC.SUM OF ELEC: 292 MOSM/KG (ref 275–295)
POTASSIUM SERPL-SCNC: 4.1 MMOL/L (ref 3.5–5.1)
SODIUM SERPL-SCNC: 140 MMOL/L (ref 136–145)

## 2023-09-26 PROCEDURE — 80048 BASIC METABOLIC PNL TOTAL CA: CPT

## 2023-09-26 PROCEDURE — 36415 COLL VENOUS BLD VENIPUNCTURE: CPT

## 2023-10-06 ENCOUNTER — ORDER TRANSCRIPTION (OUTPATIENT)
Dept: ADMINISTRATIVE | Facility: HOSPITAL | Age: 63
End: 2023-10-06

## 2023-10-06 DIAGNOSIS — R94.39 ABNORMAL CARDIOVASCULAR STRESS TEST: ICD-10-CM

## 2023-10-06 DIAGNOSIS — I25.10 CORONARY ARTERY DISEASE INVOLVING NATIVE CORONARY ARTERY OF NATIVE HEART WITHOUT ANGINA PECTORIS: ICD-10-CM

## 2023-10-06 DIAGNOSIS — I71.40 ABDOMINAL AORTIC ANEURYSM WITHOUT RUPTURE (HCC): ICD-10-CM

## 2023-10-06 DIAGNOSIS — E78.00 PURE HYPERCHOLESTEROLEMIA: Primary | ICD-10-CM

## 2023-10-06 DIAGNOSIS — I10 ESSENTIAL HYPERTENSION: ICD-10-CM

## 2023-10-06 DIAGNOSIS — I49.3 PVC'S (PREMATURE VENTRICULAR CONTRACTIONS): ICD-10-CM

## 2023-10-06 DIAGNOSIS — R93.1 ABNORMAL ECHOCARDIOGRAM: ICD-10-CM

## 2023-11-03 ENCOUNTER — HOSPITAL ENCOUNTER (OUTPATIENT)
Dept: CT IMAGING | Facility: HOSPITAL | Age: 63
Discharge: HOME | End: 2023-11-03
Attending: INTERNAL MEDICINE
Payer: COMMERCIAL

## 2023-11-03 DIAGNOSIS — I71.40 ABDOMINAL AORTIC ANEURYSM WITHOUT RUPTURE (HCC): ICD-10-CM

## 2023-11-03 DIAGNOSIS — I10 ESSENTIAL HYPERTENSION: ICD-10-CM

## 2023-11-03 DIAGNOSIS — E78.00 PURE HYPERCHOLESTEROLEMIA: ICD-10-CM

## 2023-11-03 DIAGNOSIS — R93.1 ABNORMAL ECHOCARDIOGRAM: ICD-10-CM

## 2023-11-03 DIAGNOSIS — I25.10 CORONARY ARTERY DISEASE INVOLVING NATIVE CORONARY ARTERY OF NATIVE HEART WITHOUT ANGINA PECTORIS: ICD-10-CM

## 2023-11-03 DIAGNOSIS — I49.3 PVC'S (PREMATURE VENTRICULAR CONTRACTIONS): ICD-10-CM

## 2023-11-03 DIAGNOSIS — R94.39 ABNORMAL CARDIOVASCULAR STRESS TEST: ICD-10-CM

## 2023-11-03 LAB
CREAT BLD-MCNC: 0.9 MG/DL
EGFRCR SERPLBLD CKD-EPI 2021: 96 ML/MIN/1.73M2 (ref 60–?)

## 2023-11-03 PROCEDURE — 71275 CT ANGIOGRAPHY CHEST: CPT | Performed by: INTERNAL MEDICINE

## 2023-11-03 PROCEDURE — 82565 ASSAY OF CREATININE: CPT

## 2023-11-03 RX ORDER — IOHEXOL 350 MG/ML
95 INJECTION, SOLUTION INTRAVENOUS
Status: COMPLETED | OUTPATIENT
Start: 2023-11-03 | End: 2023-11-03

## 2023-11-03 RX ADMIN — IOHEXOL 95 ML: 350 INJECTION, SOLUTION INTRAVENOUS at 09:15:00

## 2023-11-28 DIAGNOSIS — G25.0 ESSENTIAL TREMOR: Primary | ICD-10-CM

## 2023-11-28 RX ORDER — PROPRANOLOL HYDROCHLORIDE 160 MG/1
1 CAPSULE, EXTENDED RELEASE ORAL DAILY
Qty: 90 CAPSULE | Refills: 0 | Status: SHIPPED | OUTPATIENT
Start: 2023-11-28

## 2023-11-28 NOTE — TELEPHONE ENCOUNTER
Requested Prescriptions     Pending Prescriptions Disp Refills    PROPRANOLOL HCL  MG Oral Capsule SR 24 Hr [Pharmacy Med Name: propranolol  mg capsule,24 hr,extended release] 90 capsule 3     Sig: TAKE 1 CAPSULE BY MOUTH DAILY       Last OV: 12/13/22  Next OV: None  Last refilled: 12/13/22 #90/3 refills

## 2024-03-25 ENCOUNTER — TELEPHONE (OUTPATIENT)
Facility: CLINIC | Age: 64
End: 2024-03-25

## 2024-04-01 NOTE — TELEPHONE ENCOUNTER
Pt called today regarding forms, adv they were received along w/ proper auth, also adv that the Neurology department refused to collect forms and just send to us, required the pt to go to another office just to drop forms off. ( Adv management) gathered the below    Type of Leave:  LTD  Reason for Leave: Variety of ailments w/ back/ affecting ability to even stand   Start date of leave: RECERTIFICATION- yearly   How much time needed?: 12 mth interval   Forms Due Date: none given   Was Fee and Turnaround info Given?: yes

## 2024-04-02 NOTE — TELEPHONE ENCOUNTER
Dr. Palafox,    Pt is seeking permanent disab starting 8/3/21 due to back issues. Do you support?    Thank you,    Huong MAHONEY

## 2024-04-02 NOTE — TELEPHONE ENCOUNTER
Lmtcb to clarify dates needed for disab and if pt is seeking permanent disab. Need exact start date of disability.

## 2024-04-02 NOTE — TELEPHONE ENCOUNTER
Patient called back. Patient is on permanent disability but will call back with the exact start date for disability

## 2024-04-04 DIAGNOSIS — I10 ESSENTIAL (PRIMARY) HYPERTENSION: ICD-10-CM

## 2024-04-04 DIAGNOSIS — G25.0 ESSENTIAL TREMOR: ICD-10-CM

## 2024-04-04 DIAGNOSIS — E78.00 PURE HYPERCHOLESTEROLEMIA, UNSPECIFIED: ICD-10-CM

## 2024-04-04 RX ORDER — PROPRANOLOL HYDROCHLORIDE 160 MG/1
1 CAPSULE, EXTENDED RELEASE ORAL DAILY
Qty: 90 CAPSULE | Refills: 0 | OUTPATIENT
Start: 2024-04-04

## 2024-04-04 NOTE — TELEPHONE ENCOUNTER
In going through the notes, it appears the previous disability form was completed by his physiatry doctor, Dr. Lambert.  If additional permanent disability forms are required, it should come from this same doctor who is specialized to care for the patient's condition which has caused him to be disabled.

## 2024-04-05 ENCOUNTER — TELEPHONE (OUTPATIENT)
Dept: PHYSICAL MEDICINE AND REHAB | Facility: CLINIC | Age: 64
End: 2024-04-05

## 2024-04-05 DIAGNOSIS — G25.0 ESSENTIAL TREMOR: ICD-10-CM

## 2024-04-05 RX ORDER — CLOPIDOGREL BISULFATE 75 MG/1
75 TABLET ORAL DAILY
Qty: 90 TABLET | Refills: 3 | Status: SHIPPED | OUTPATIENT
Start: 2024-04-05

## 2024-04-05 RX ORDER — AMLODIPINE BESYLATE 10 MG/1
10 TABLET ORAL DAILY
Qty: 90 TABLET | Refills: 3 | Status: SHIPPED | OUTPATIENT
Start: 2024-04-05

## 2024-04-05 NOTE — TELEPHONE ENCOUNTER
Please review. Protocol failed / No Protocol.    LOV 4/2023      Patient is here for follow-up on chronic medical issues as listed below.  Last seen here on August 8.  No changes made at that time.  Since that time he has seen neurology for the essential tremors.  Current medications reviewed.  Health maintenance and vaccination status reviewed.  Patient had a chest CT last July and they saw some pulmonary nodules which were stable.  Did advise repeat in 1 year. Labs were done in Dec of last year.   Pt is having insurance issues with coverage on the Cardio coverage on testing and doctors.  Last saw Dr Childs with [facility]; changed prasugel to plavix; given order for echo.  Requested Prescriptions   Pending Prescriptions Disp Refills    CLOPIDOGREL 75 MG Oral Tab [Pharmacy Med Name: clopidogrel 75 mg tablet] 90 tablet 3     Sig: Take 1 tablet (75 mg total) by mouth daily.       There is no refill protocol information for this order       AMLODIPINE 10 MG Oral Tab [Pharmacy Med Name: amlodipine 10 mg tablet] 90 tablet 3     Sig: Take 1 tablet (10 mg total) by mouth daily.       Hypertension Medications Protocol Passed - 4/4/2024  1:57 PM        Passed - CMP or BMP in past 12 months        Passed - Last BP reading less than 140/90     BP Readings from Last 1 Encounters:   04/24/23 122/70               Passed - In person appointment or virtual visit in the past 12 mos or appointment in next 3 mos     Recent Outpatient Visits              11 months ago Essential hypertension with goal blood pressure less than 140/90    [facility], [facility] [place]    Office Visit    1 year ago Essential tremor    [facility], Luiz Garland MD    Office Visit    1 year ago Essential hypertension with goal blood pressure less than 140/90    [facility]    Office Visit    2 years ago L3-4  central and bilateral foraminal stenosis    [facility] Ramiro Lambert DO    Office Visit    2 years ago Abnormal stress test    [facility]    Nurse Only          Future Appointments         Provider Department Appt Notes    In 1 week Ramiro Lambert DO [facility]     In 2 months Tee Palafox MD [facility] supervisit, last px 2021. also has a mole that he wants checked out               Passed - EGFRCR or GFRNAA > 50     GFR Evaluation  EGFRCR: 96 , resulted on 11/3/2023

## 2024-04-08 RX ORDER — PROPRANOLOL HYDROCHLORIDE 160 MG/1
1 CAPSULE, EXTENDED RELEASE ORAL DAILY
Qty: 90 CAPSULE | Refills: 0 | OUTPATIENT
Start: 2024-04-08

## 2024-04-08 NOTE — TELEPHONE ENCOUNTER
Requested Prescriptions     Pending Prescriptions Disp Refills    PROPRANOLOL HCL  MG Oral Capsule SR 24 Hr [Pharmacy Med Name: propranolol  mg capsule,24 hr,extended release] 90 capsule 0     Sig: Take 1 capsule (160 mg total) by mouth daily. APPOINTMENT NEEDED FOR FURTHER REFILLS        LOV: 12/13/22  NOV: none    Last refill/[place]: 11/28/23    Denied-needs appt

## 2024-04-09 NOTE — TELEPHONE ENCOUNTER
Dr Lambert,    Pt is seeking permanent disab starting 8/3/21 due to back issues. Do you support?     Thank you,     Huong MAHONEY

## 2024-04-12 NOTE — TELEPHONE ENCOUNTER
Pt has upcoming appt on 4/18/24. Will keep forms in my box until pt completes appt. Pt has been made aware of this. Pt aware to mention disability forms at his appt.

## 2024-04-18 ENCOUNTER — OFFICE VISIT (OUTPATIENT)
Dept: PHYSICAL MEDICINE AND REHAB | Facility: CLINIC | Age: 64
End: 2024-04-18
Payer: MEDICARE

## 2024-04-18 VITALS — WEIGHT: 260 LBS | HEART RATE: 92 BPM | HEIGHT: 74 IN | OXYGEN SATURATION: 96 % | BODY MASS INDEX: 33.37 KG/M2

## 2024-04-18 DIAGNOSIS — S76.112S QUADRICEPS TENDON RUPTURE, LEFT, SEQUELA: ICD-10-CM

## 2024-04-18 DIAGNOSIS — M47.816 LUMBAR FACET ARTHROPATHY: Primary | ICD-10-CM

## 2024-04-18 DIAGNOSIS — G25.0 ESSENTIAL TREMOR: ICD-10-CM

## 2024-04-18 PROCEDURE — 99215 OFFICE O/P EST HI 40 MIN: CPT | Performed by: PHYSICAL MEDICINE & REHABILITATION

## 2024-04-18 RX ORDER — DULOXETIN HYDROCHLORIDE 20 MG/1
20 CAPSULE, DELAYED RELEASE ORAL DAILY
Qty: 30 CAPSULE | Refills: 1 | Status: SHIPPED | OUTPATIENT
Start: 2024-04-18

## 2024-04-18 RX ORDER — PROPRANOLOL HYDROCHLORIDE 160 MG/1
1 CAPSULE, EXTENDED RELEASE ORAL DAILY
Qty: 90 CAPSULE | Refills: 0 | Status: SHIPPED | OUTPATIENT
Start: 2024-04-18

## 2024-04-18 RX ORDER — PROPRANOLOL HYDROCHLORIDE 160 MG/1
1 CAPSULE, EXTENDED RELEASE ORAL DAILY
Qty: 90 CAPSULE | Refills: 0 | OUTPATIENT
Start: 2024-04-18

## 2024-04-18 NOTE — PROGRESS NOTES
Progress note    C/C:   Chief Complaint   Patient presents with    Follow - Up     LOV 1/27/2022 Pt is here for a follow up. Reports constant aching pain throughout his low back and left knee. Intermittent sharp and stabbing pain. Currently not taking any pain meds or muscle relaxers.States pain increases throughout the day. No N/T.Pain 5/10        HPI: 64 year old male presents for follow up. He has a history of a left quad tendon tear that was repaired surgically in 2015; per patient had a spasm in the left leg, retore the tendon and was told that no further surgery was recommended. I began seeing him due to lower back pain, and for a suspicion that the spasms might be caused from a low back disorder.     I last saw him in 2022; he has been on long term disability due to persistent lower back and left knee pain. The left knee continues to spasm intermittently, not daily; when it does it causes significant left distal quadriceps pain localized across the area of the superior pole of the patella. He also has lower back pain radiating into the left buttock that worsens with prolonged movement; he often goes from sitting to standing to lying to relieve lower back pain.     He has not been in physical therapy; apparently was told not to do physical therapy by a physician, he is not sure who. I do not see in the notes that any physicians restricted his activity. He also states that therapists have been concerned working with him in the past due to his cardiac history and history of aortic aneurysm under surveillance.     He also continues to have a tremor. Was previously seen by neurology in 2022, propranolol dose was increased for essential tremor. The propranolol has not been helping the tremor. He states that he does not usually have a tremor upon awakening; after about an hour once pain worsens the tremor then begins.     Pertinent allergies:   Allergies   Allergen Reactions    No Known Allergies OTHER (SEE  COMMENTS)    Other OTHER (SEE COMMENTS)    Pollen Runny nose        Physical exam:  Ht 74\"   Wt 260 lb (117.9 kg)   BMI 33.38 kg/m²      + pain with lumbar flexion. + pain with lumbar extension. Extension is more painful than flexion. ROM is mildly limited in both planes.   + tenderness to palpation bilateral lumbar paraspinals somewhat diffusely. He has no significant thoracic paraspinal ttp.   5/5 LE strength b/l, except for slight weakness of left quad extension  SILT b/l BUE and LE  UE and LE reflexes b/l  SLR negative left  Ortiz test negative bilaterally    left knee exam  Observation: no appreciable effusion    Range of motion:  Flexion: 110 degrees  Extension: 0 degrees    Palpation:  Medial joint line tenderness: negative  Lateral joint line tenderness: negative  Medial patellar facet tenderness: +  Lateral patellar facet: +    Provocative tests:  Lachman: negative  Valgus and varus stress testing: negative    Imaging: No new imaging to review    Assessment and plan  Lumbar facet arthropathy  Left quadriceps tendon rupture s/p repair, with persistent left knee pain  Essential tremor  AAA, under survaillence  CAD, on DAPT    He should start physical therapy to correct the abnormal postures and movement patterns he has developed due to chronic low back and left knee pain. He is concerned about cardiovascular health and starting PT; while he should avoid heavy lifting he may certainly perform ROM exercises, light to moderate strengthening exercises, and is cleared to do aerobic exercise. I also recommend he start duloxetine 20mg qDaily for chronic musculoskeletal pain, and I encourage him to follow up with neurology as well. I also recommend an MRI of the left knee given the persistent pain and concerns of tendon rupture, though it is noteworthy that he can fully extend the left knee volitionally.     Depending on the extent of any paperwork that needs filling out he may need an FCE.     40 minutes spent  precharting, conducting H&P, discussing treatment options, completing documentation.    Ramiro Lambert DO  Physical Medicine and Rehabilitation  [facility]

## 2024-04-18 NOTE — TELEPHONE ENCOUNTER
Requested Prescriptions     Pending Prescriptions Disp Refills    PROPRANOLOL HCL  MG Oral Capsule SR 24 Hr [Pharmacy Med Name: propranolol  mg capsule,24 hr,extended release] 90 capsule 0     Sig: Take 1 capsule (160 mg total) by mouth daily. APPOINTMENT NEEDED FOR FURTHER REFILLS       Last OV: 12/13/22  Next OV: None  Last refilled: 11/28/23 #90      Front end, please contact the patient to schedule an OV when the schedules become available.    Message to Dr. Paiz to review and advise if a 90 day refill can be given until the schedules become available.  Reviewed and electronically signed by: ZI Gutierrez

## 2024-04-19 NOTE — TELEPHONE ENCOUNTER
Dr. Lambert,     *The ACKNOWLEDGE button has been moved to the top right ribbon*    Please sign off on form if you agree to: Disab for permanently disability  (place your signature on the first page only)    -From your Inbasket, Highlight the patient and click Chart   -Double click the 4/5/24 Forms Completion telephone encounter  -Scroll down to the Media section   -Click the blue Hyperlink: Disab Dr Lambert 4/19/24  -Click Acknowledge located in the top right ribbon/menu   -Drag the mouse into the blank space of the document and a + sign will appear. Left click to   electronically sign the document.     Thank you,    Huong MAHONEY

## 2024-04-23 ENCOUNTER — TELEPHONE (OUTPATIENT)
Dept: PHYSICAL THERAPY | Facility: HOSPITAL | Age: 64
End: 2024-04-23

## 2024-05-01 ENCOUNTER — HOSPITAL ENCOUNTER (OUTPATIENT)
Dept: GENERAL RADIOLOGY | Facility: HOSPITAL | Age: 64
Discharge: HOME | End: 2024-05-01
Attending: INTERNAL MEDICINE
Payer: MEDICARE

## 2024-05-01 ENCOUNTER — LAB ENCOUNTER (OUTPATIENT)
Dept: LAB | Facility: HOSPITAL | Age: 64
End: 2024-05-01
Attending: INTERNAL MEDICINE
Payer: MEDICARE

## 2024-05-01 DIAGNOSIS — I71.40 ABDOMINAL AORTIC ANEURYSM WITHOUT RUPTURE (HCC): ICD-10-CM

## 2024-05-01 DIAGNOSIS — E78.5 HYPERLIPEMIA: ICD-10-CM

## 2024-05-01 DIAGNOSIS — I25.10 CORONARY ATHEROSCLEROSIS OF NATIVE CORONARY ARTERY: Primary | ICD-10-CM

## 2024-05-01 DIAGNOSIS — I10 ESSENTIAL HYPERTENSION, MALIGNANT: ICD-10-CM

## 2024-05-01 LAB
ANION GAP SERPL CALC-SCNC: 6 MMOL/L (ref 0–18)
BASOPHILS # BLD AUTO: 0.13 X10(3) UL (ref 0–0.2)
BASOPHILS NFR BLD AUTO: 1.6 %
BUN BLD-MCNC: 17 MG/DL (ref 9–23)
BUN/CREAT SERPL: 16.2 (ref 10–20)
CALCIUM BLD-MCNC: 10.1 MG/DL (ref 8.7–10.4)
CHLORIDE SERPL-SCNC: 104 MMOL/L (ref 98–112)
CO2 SERPL-SCNC: 29 MMOL/L (ref 21–32)
CREAT BLD-MCNC: 1.05 MG/DL
DEPRECATED RDW RBC AUTO: 40.7 FL (ref 35.1–46.3)
EGFRCR SERPLBLD CKD-EPI 2021: 79 ML/MIN/1.73M2 (ref 60–?)
EOSINOPHIL # BLD AUTO: 0.21 X10(3) UL (ref 0–0.7)
EOSINOPHIL NFR BLD AUTO: 2.6 %
ERYTHROCYTE [DISTWIDTH] IN BLOOD BY AUTOMATED COUNT: 12.1 % (ref 11–15)
FASTING STATUS PATIENT QL REPORTED: NO
GLUCOSE BLD-MCNC: 121 MG/DL (ref 70–99)
HCT VFR BLD AUTO: 40.5 %
HGB BLD-MCNC: 14.8 G/DL
IMM GRANULOCYTES # BLD AUTO: 0.03 X10(3) UL (ref 0–1)
IMM GRANULOCYTES NFR BLD: 0.4 %
LYMPHOCYTES # BLD AUTO: 1.67 X10(3) UL (ref 1–4)
LYMPHOCYTES NFR BLD AUTO: 20.5 %
MCH RBC QN AUTO: 33.3 PG (ref 26–34)
MCHC RBC AUTO-ENTMCNC: 36.5 G/DL (ref 31–37)
MCV RBC AUTO: 91.2 FL
MONOCYTES # BLD AUTO: 1.1 X10(3) UL (ref 0.1–1)
MONOCYTES NFR BLD AUTO: 13.5 %
NEUTROPHILS # BLD AUTO: 5 X10 (3) UL (ref 1.5–7.7)
NEUTROPHILS # BLD AUTO: 5 X10(3) UL (ref 1.5–7.7)
NEUTROPHILS NFR BLD AUTO: 61.4 %
OSMOLALITY SERPL CALC.SUM OF ELEC: 291 MOSM/KG (ref 275–295)
PLATELET # BLD AUTO: 218 10(3)UL (ref 150–450)
POTASSIUM SERPL-SCNC: 3.7 MMOL/L (ref 3.5–5.1)
RBC # BLD AUTO: 4.44 X10(6)UL
SODIUM SERPL-SCNC: 139 MMOL/L (ref 136–145)
WBC # BLD AUTO: 8.1 X10(3) UL (ref 4–11)

## 2024-05-01 PROCEDURE — 85025 COMPLETE CBC W/AUTO DIFF WBC: CPT

## 2024-05-01 PROCEDURE — 80048 BASIC METABOLIC PNL TOTAL CA: CPT

## 2024-05-01 PROCEDURE — 36415 COLL VENOUS BLD VENIPUNCTURE: CPT

## 2024-05-01 PROCEDURE — 71046 X-RAY EXAM CHEST 2 VIEWS: CPT | Performed by: INTERNAL MEDICINE

## 2024-05-10 ENCOUNTER — HOSPITAL ENCOUNTER (OUTPATIENT)
Dept: INTERVENTIONAL RADIOLOGY/VASCULAR | Facility: HOSPITAL | Age: 64
Discharge: HOME | End: 2024-05-10
Attending: INTERNAL MEDICINE | Admitting: INTERNAL MEDICINE

## 2024-05-10 VITALS
HEART RATE: 59 BPM | BODY MASS INDEX: 33.37 KG/M2 | TEMPERATURE: 98 F | SYSTOLIC BLOOD PRESSURE: 121 MMHG | HEIGHT: 74 IN | RESPIRATION RATE: 16 BRPM | DIASTOLIC BLOOD PRESSURE: 79 MMHG | OXYGEN SATURATION: 95 % | WEIGHT: 260 LBS

## 2024-05-10 DIAGNOSIS — R06.02 SOB (SHORTNESS OF BREATH): ICD-10-CM

## 2024-05-10 DIAGNOSIS — R94.39 ABNORMAL NUCLEAR STRESS TEST: ICD-10-CM

## 2024-05-10 PROCEDURE — 93458 L HRT ARTERY/VENTRICLE ANGIO: CPT | Performed by: INTERNAL MEDICINE

## 2024-05-10 PROCEDURE — 4A023N8 MEASUREMENT OF CARDIAC SAMPLING AND PRESSURE, BILATERAL, PERCUTANEOUS APPROACH: ICD-10-PCS | Performed by: INTERNAL MEDICINE

## 2024-05-10 PROCEDURE — 99152 MOD SED SAME PHYS/QHP 5/>YRS: CPT | Performed by: INTERNAL MEDICINE

## 2024-05-10 PROCEDURE — 36415 COLL VENOUS BLD VENIPUNCTURE: CPT

## 2024-05-10 PROCEDURE — B2111ZZ FLUOROSCOPY OF MULTIPLE CORONARY ARTERIES USING LOW OSMOLAR CONTRAST: ICD-10-PCS | Performed by: INTERNAL MEDICINE

## 2024-05-10 RX ORDER — CLOPIDOGREL BISULFATE 75 MG/1
75 TABLET ORAL ONCE
Status: COMPLETED | OUTPATIENT
Start: 2024-05-10 | End: 2024-05-10

## 2024-05-10 RX ORDER — HEPARIN SODIUM 1000 [USP'U]/ML
INJECTION, SOLUTION INTRAVENOUS; SUBCUTANEOUS
Status: COMPLETED
Start: 2024-05-10 | End: 2024-05-10

## 2024-05-10 RX ORDER — MIDAZOLAM HYDROCHLORIDE 1 MG/ML
INJECTION INTRAMUSCULAR; INTRAVENOUS
Status: COMPLETED
Start: 2024-05-10 | End: 2024-05-10

## 2024-05-10 RX ORDER — CLOPIDOGREL BISULFATE 75 MG/1
TABLET ORAL
Status: COMPLETED
Start: 2024-05-10 | End: 2024-05-10

## 2024-05-10 RX ORDER — CHLORHEXIDINE GLUCONATE 40 MG/ML
30 SOLUTION TOPICAL
Status: COMPLETED | OUTPATIENT
Start: 2024-05-10 | End: 2024-05-10

## 2024-05-10 RX ORDER — NITROGLYCERIN 20 MG/100ML
INJECTION INTRAVENOUS
Status: COMPLETED
Start: 2024-05-10 | End: 2024-05-10

## 2024-05-10 RX ORDER — SODIUM CHLORIDE 9 MG/ML
3 INJECTION, SOLUTION INTRAVENOUS CONTINUOUS
Status: DISCONTINUED | OUTPATIENT
Start: 2024-05-10 | End: 2024-05-10

## 2024-05-10 RX ORDER — ASPIRIN 81 MG/1
324 TABLET, CHEWABLE ORAL ONCE
Status: DISCONTINUED | OUTPATIENT
Start: 2024-05-10 | End: 2024-05-10

## 2024-05-10 RX ORDER — LIDOCAINE HYDROCHLORIDE 20 MG/ML
INJECTION, SOLUTION EPIDURAL; INFILTRATION; INTRACAUDAL; PERINEURAL
Status: COMPLETED
Start: 2024-05-10 | End: 2024-05-10

## 2024-05-10 RX ORDER — SODIUM CHLORIDE 9 MG/ML
INJECTION, SOLUTION INTRAVENOUS
Status: DISCONTINUED | OUTPATIENT
Start: 2024-05-10 | End: 2024-05-10

## 2024-05-10 RX ORDER — VERAPAMIL HYDROCHLORIDE 2.5 MG/ML
INJECTION, SOLUTION INTRAVENOUS
Status: COMPLETED
Start: 2024-05-10 | End: 2024-05-10

## 2024-05-10 RX ADMIN — CHLORHEXIDINE GLUCONATE 30 ML: 40 SOLUTION TOPICAL at 08:15:00

## 2024-05-10 RX ADMIN — CLOPIDOGREL BISULFATE 75 MG: 75 TABLET ORAL at 09:30:00

## 2024-05-10 RX ADMIN — SODIUM CHLORIDE 3 ML/KG/HR: 9 INJECTION, SOLUTION INTRAVENOUS at 08:15:00

## 2024-05-10 NOTE — IVS NOTE
DISCHARGE NOTE     Pt is able to sit up and ambulate without difficulty.   Pt voided and tolerated fluids and food.   Right radial procedural site remains dry and intact with good circulation, motion and sensation.  Armboard in place.   No signs or symptoms of bleeding/hematoma noted.   Pt denies any pain or discomfort at this time.  IV access removed  Instructions provided, patient/family verbalizes understanding.   Dr. Almanza spoke with patient/family post procedure.     Pt discharge via wheelchair to [facility]      Follow up Appointment: 5/22 at 11:30AM with Heather CANTU    New Prescription: n/a

## 2024-05-10 NOTE — INTERVAL H&P NOTE
Pre-op Diagnosis: * No pre-op diagnosis entered *    The above referenced H&P was reviewed by Michael Almanza MD on 5/10/2024, the patient was examined and no significant changes have occurred in the patient's condition since the H&P was performed.  I discussed with the patient and/or legal representative the potential benefits, risks and side effects of this procedure; the likelihood of the patient achieving goals; and potential problems that might occur during recuperation.  I discussed reasonable alternatives to the procedure, including risks, benefits and side effects related to the alternatives and risks related to not receiving this procedure.  We will proceed with procedure as planned.

## 2024-05-10 NOTE — DISCHARGE INSTRUCTIONS
TRANSRADIAL ANGIOGRAM DISCHARGE INSTRUCTIONS AND HOME CARE    Activity    DO NOT drive after the procedure.  You may resume driving late the following day according to the nurse or physician's instructions  Plan on resting and relaxing tonight and tomorrow  Resume your normal activity after 48 hours, or as instructed by your physician  Avoid drinking alcohol for the next 24 hours  Avoid wrist flexion, extension, and fine motor activities (i.e. texting, typing, using computer mouse, etc.) for 24 hours.  The armboard will help remind you not to do these motions.   Do not lift or pull anything heavier than 5 to 8 pounds and avoid pushing up with the affected hand for 1 week    What is Normal?    A small lump at the procedure site associated with mild tenderness when touched  The procedure site may be bruised or discolored  There may be a small amount of drainage on the bandage    Special Instructions     Drink plenty of fluids during the next 24 hours to \"flush\" the contrast from your system  Keep the bandage clean and dry  After 24 hours, you remove the bandage and armboard.  Remove the dressing and armboard tomorrow anytime after 12PM.  Do not put ointment, powders, or creams to site.   You can shower after removing the bandage, and wash the procedure site gently with soap and water  DO NOT submerge the procedure site for 1 week (no bath tubs, pools or washing dishes)  If you choose to wear a bandage for a few days, make sure it remains clean and dry and that it is changed daily  For local swelling: apply ice  Bleeding can occur at the procedure site - both on the outside of the skin and/or beneath the surface of the skin        If bleeding occurs:   Elevate hand above heart and apply pressure to the procedure site with 2-3 fingers, as instructed by the nurse.  Hold pressure for 20 minutes and the bleeding should stop.  Notify your physician of the occurrence.  If the bleeding does not stop, call 911 and continue  to apply pressure      When to contact physician: Call Dr. Almanza at [phone] right away if you experience     Increased swelling or a large lump, pain or bleeding at the site that is not relieved by applying ice or pressure  Signs of infection: Redness, warmth, drainage at the site, chills, or temperature of 100.5 or greater  Changes in sensation, numbness, or tingling of affected hand      Other    You may resume your present diet, unless otherwise specified by your physician.    You may resume all of your medications as prescribed, unless otherwise directed by your physician.  A list of your medications was provided to you at discharge.  Gradually resume your previous aerobic exercise schedule as directed by your physician.      If you have any question or concern, please call the on-call nurse at [phone]

## 2024-05-10 NOTE — IVS NOTE
DISCHARGE NOTE      Pt is able to sit up and ambulate without difficulty.   Pt voided and tolerated fluids and food.   Procedural site remains dry and intact with good circulation, motion, and sensation.   No signs and symptoms of bleeding/hematoma noted.   IV access removed  Instruction provided, patient/family verbalizes understanding.   Dr. Almanza  spoke with patient/family post procedure.      Pt discharge via wheelchair to [facility]   Pt discharge via stretcher to Nursing Facility      Follow up Appointment: 5/22 1130 [address] Prescription:

## 2024-05-13 ENCOUNTER — TELEPHONE (OUTPATIENT)
Dept: PHYSICAL MEDICINE AND REHAB | Facility: CLINIC | Age: 64
End: 2024-05-13

## 2024-05-13 ENCOUNTER — MED REC SCAN ONLY (OUTPATIENT)
Dept: PHYSICAL MEDICINE AND REHAB | Facility: CLINIC | Age: 64
End: 2024-05-13

## 2024-05-13 NOTE — TELEPHONE ENCOUNTER
Please call to ask for Eliel @ [employer], he is wanting to speak with RN staff to make sure everyone is on the same page regarding this pts care.

## 2024-05-17 NOTE — TELEPHONE ENCOUNTER
Received call from MANDY Julian with [employer]. Eliel stated that an FCE was performed on 05/07/2024. Eliel was unsure of fax date of report. Per TE 04/05/2024, the last updated note from Dr. Lambert was that we were awaiting an FCE. Gave Eliel direct fax number to re-fax reports. Once reports are received will update Dr. Lambert & place forms in his mailbox for his review. (See TE 04/05/2024 for routing information).

## 2024-05-20 ENCOUNTER — TELEPHONE (OUTPATIENT)
Dept: PHYSICAL MEDICINE AND REHAB | Facility: CLINIC | Age: 64
End: 2024-05-20

## 2024-05-20 NOTE — TELEPHONE ENCOUNTER
Patient called for status on forms - per patient will like to know what is the next step to processing forms- Advised patient will ask speak to  about forms to see what else can be done so his claim wont be delayed- verbal understanding from patient

## 2024-05-20 NOTE — TELEPHONE ENCOUNTER
Called patient to advised to call MD office in regards to his forms and FCE form- We completed the forms needs for disability on our end but no signature from MD yet.. Verbal understanding from patient will call MD office to ask next processing steps.

## 2024-05-22 NOTE — PROCEDURES
[facility]  part of [facility]    Cardiac Cath Procedure Note  Blue Acevedo Patient Status:  Outpatient    3/22/1960 MRN [id number]   Location [facility] CARDIOVASCULAR OBSERVATION Attending No att. providers found   Hosp Day # 0 PCP Tee Palafox MD       Cardiologist: Michael Almanza MD  Primary Proceduralist: Michael Almanza MD  Procedure Performed: LHC and LV  Date of Procedure: 5/10/2024   Indication: Positive stress test    Summary of procedure:  Normal coronary anatomy      Left Ventriculography and hemodynamics:   LV EF not done  LV EDP 12 mm Hg  No gradient across aortic valve        Coronary Angiography  RCA:  Dominant and free of obstructive disease, supplies PDA and PL    Left main:  Free of obstructive disease    Left anterior descending:  Free of obstructive disease, supplies multiple diagonals which are non-obstructive    Circumflex:  Free of obstructive disease, supplies multiple OM branches which are patent        Summary of Case: After written informed consent was obtained from the patient, patient was brought to the cardiac catheterization laboratory.  Patient was prepped and draped in the usual sterile fashion. Lidocaine 1% was used to infiltrate the right radial artery for local anesthesia and a 6 Amharic introducer sheath was inserted into the right radial artery.      Selective coronary angiography performed with JR4 catheter for RCA and JL3.5 catheter for LCA.  Angiography performed in standard projections.      6 Yakut JR4 catheter placed in LV for hemodynamics.    Specimen sent to: No specimen collected  Estimated blood loss: 10 cc  Closure:  TR band      IV was maintained by RN and moderate conscious sedation of versed and fentanyl was given.  Patient was assessed and monitoring of oxygen, heart rate and blood pressure by nurse and myself during the exam 35 minutes.      Michael Almanza MD  24

## 2024-06-17 ENCOUNTER — OFFICE VISIT (OUTPATIENT)
Facility: CLINIC | Age: 64
End: 2024-06-17

## 2024-06-17 VITALS
SYSTOLIC BLOOD PRESSURE: 128 MMHG | BODY MASS INDEX: 33.47 KG/M2 | HEART RATE: 67 BPM | DIASTOLIC BLOOD PRESSURE: 78 MMHG | HEIGHT: 74 IN | WEIGHT: 260.81 LBS

## 2024-06-17 DIAGNOSIS — Z12.5 SCREENING FOR PROSTATE CANCER: ICD-10-CM

## 2024-06-17 DIAGNOSIS — R91.1 PULMONARY NODULE: ICD-10-CM

## 2024-06-17 DIAGNOSIS — M51.9 LUMBAR DISC DISEASE: ICD-10-CM

## 2024-06-17 DIAGNOSIS — D23.9 BENIGN NEOPLASM OF SKIN, UNSPECIFIED LOCATION: ICD-10-CM

## 2024-06-17 DIAGNOSIS — I25.10 CORONARY ARTERY DISEASE INVOLVING NATIVE CORONARY ARTERY OF NATIVE HEART, UNSPECIFIED WHETHER ANGINA PRESENT: ICD-10-CM

## 2024-06-17 DIAGNOSIS — Z12.11 COLON CANCER SCREENING: ICD-10-CM

## 2024-06-17 DIAGNOSIS — Z00.00 ENCOUNTER FOR ANNUAL HEALTH EXAMINATION: Primary | ICD-10-CM

## 2024-06-17 DIAGNOSIS — I10 ESSENTIAL HYPERTENSION WITH GOAL BLOOD PRESSURE LESS THAN 140/90: ICD-10-CM

## 2024-06-17 DIAGNOSIS — G25.0 ESSENTIAL TREMOR: ICD-10-CM

## 2024-06-17 DIAGNOSIS — F17.200 TOBACCO USE DISORDER: ICD-10-CM

## 2024-06-17 DIAGNOSIS — I71.40 ABDOMINAL AORTIC ANEURYSM (AAA) WITHOUT RUPTURE, UNSPECIFIED PART (HCC): ICD-10-CM

## 2024-06-17 RX ORDER — PROPRANOLOL HYDROCHLORIDE 80 MG/1
80 CAPSULE, EXTENDED RELEASE ORAL DAILY
Qty: 90 CAPSULE | Refills: 1 | Status: SHIPPED | OUTPATIENT
Start: 2024-06-17

## 2024-06-17 RX ORDER — MELOXICAM 15 MG/1
15 TABLET ORAL DAILY
Qty: 30 TABLET | Refills: 5 | Status: SHIPPED | OUTPATIENT
Start: 2024-06-17

## 2024-06-17 NOTE — PROGRESS NOTES
Subjective:   Blue Acevedo is a 64 year old male who presents for a Medicare Initial Preventative Physical Exam (Welcome to Medicare- < 12 months on Medicare) and scheduled follow up of multiple significant but stable problems.     Patient is here for Medicare annual wellness visit and follow-up on chronic medical issues as listed below.  I last saw him in April 2023.  Having some insurance issues at that time particular coverage for cardiologist.  Current medications reviewed.  Health maintenance and vaccination status reviewed.  Advanced directives reviewed.  Since His last visit he has seen physiatry for his lumbar disc disease.  Also saw cardiology.  Positive stress test which led to a cardiac catheterization that showed no significant obstruction in any of the coronary arteries.  He is due for full blood work.     PT is doing ok. Has good days and bad days with his back and his left knee. Had functional capacity test recently; still qualified to work (?) with physical limitations. He is currently on disability and Medicare. He has a lot of lumbar disc disease. ALso with cervical spinal stenosis. Hx of moles removed from neck; now with one on the left eyelid; it can cycle through crusting over and bleeding. One time, he went two days without his meds. The intent tremors calmed down off of the meds; he wonders about the cause; has issues with writing or fine manipulation. Patient does check blood pressure at home. It has been running around 124/72. Still smoking- about 1/2 PPD; it is hard to quit unless he can be more active.  Had a CT in Nov 2023; they request repeat in one year. Not much EtOH now. Diet is sporadic; less regularly scheduled since he is not working. Weight is the same. Pt declines pneumonia vaccine.       History/Other:   Fall Risk Assessment:   He has been screened for Falls and is High Risk. Fall Prevention information provided to patient in After Visit Summary.    Do you feel  unsteady when standing or walking?: Yes  Do you worry about falling?: Yes  Have you fallen in the past year?: No     Cognitive Assessment:   He had a completely normal cognitive assessment - see flowsheet entries     Functional Ability/Status:   Blue Acevedo has some abnormal functions as listed below:  He has Walking problems based on screening of functional status.       Depression Screening (PHQ-2/PHQ-9): PHQ-2 SCORE: 0  , done 6/17/2024   Last [facility] Suicide Screening on 6/17/2024 was No Risk.          Advanced Directives:   He does NOT have a Living Will. [Do you have a living will?: No]  He does NOT have a Power of  for Health Care. [Do you have a healthcare power of ?: No]  Discussed Advance Care Planning with patient (and family/surrogate if present). Standard forms made available to patient in After Visit Summary.      Patient Active Problem List   Diagnosis    Synovitis and tenosynovitis    Screen for colon cancer    Essential hypertension    Lumbar facet arthropathy    Bilateral primary osteoarthritis of hip    L3-4 central and bilateral foraminal stenosis    PVCs (premature ventricular contractions)    Abnormal cardiovascular stress test    Abdominal aortic aneurysm (AAA) without rupture (HCC)    Smoker    Dyslipidemia     Allergies:  He is allergic to pollen.    Current Medications:  Outpatient Medications Marked as Taking for the 6/17/24 encounter (Office Visit) with Tee Palafox MD   Medication Sig    DULoxetine 20 MG Oral Cap DR Particles Take 1 capsule (20 mg total) by mouth daily.    Propranolol HCl  MG Oral Capsule SR 24 Hr Take 1 capsule (160 mg total) by mouth daily. APPOINTMENT NEEDED FOR FURTHER REFILLS    clopidogrel 75 MG Oral Tab Take 1 tablet (75 mg total) by mouth daily.    amLODIPine 10 MG Oral Tab Take 1 tablet (10 mg total) by mouth daily.    losartan-hydroCHLOROthiazide 100-25 MG Oral Tab Take 1 tablet by mouth daily.    ROSUVASTATIN 40 MG Oral Tab  TAKE 1 TABLET BY MOUTH NIGHTLY    aspirin 81 MG Oral Tab EC Take 1 tablet (81 mg total) by mouth daily.    MELOXICAM 15 MG Oral Tab TAKE 1 TABLET BY MOUTH DAILY (Patient taking differently: daily as needed.)    GLUCOSAMINE CHONDROITIN COMPLX OR Take 750 mg by mouth.    Elastic Bandages & Supports (JOBST RELIEF 30-40MMHG LARGE) Does not apply Misc AS DIRECTED    B Complex Vitamins (B-COMPLEX/B-12 TR) Oral Tab CR Take  by mouth.       Medical History:  He  has a past medical history of Bilateral primary osteoarthritis of hip (12/12/2017), Cold with flu (3/22/15), DVT (deep venous thrombosis) ([facility]) (2007), Essential hypertension with goal blood pressure less than 140/90 (12/8/2016), Extrinsic asthma, unspecified, H/O blood clots, L3-4 central and bilateral foraminal stenosis (1/26/2018), and Lumbar facet arthropathy (12/12/2017).  Surgical History:  He  has a past surgical history that includes knee arthroscopy (Right, 2013); fix quad/hamstr musc rupt,primary (Left, 4/6/2015); and other surgical history (Left, 04/06/2015).   Family History:  His family history is not on file.  Social History:  He  reports that he has been smoking cigarettes. He has a 12 pack-year smoking history. He has never used smokeless tobacco. He reports current alcohol use of about 6.0 standard drinks of alcohol per week. He reports current drug use. Frequency: 0.50 times per week. Drug: Cannabis.    Tobacco:  Social History     Tobacco Use   Smoking Status Some Days    Current packs/day: 0.30    Average packs/day: 0.3 packs/day for 40.0 years (12.0 ttl pk-yrs)    Types: Cigarettes   Smokeless Tobacco Never   Tobacco Comments    light smoker     E-Cigarettes/Vaping       Questions Responses    E-Cigarette Use Never User          E-Cigarette/Vaping Substances       Questions Responses    Nicotine No    THC No    CBD No    Flavoring No          E-Cigarette/Vaping Devices       Questions Responses    Disposable No    Pre-filled or Refillable  Cartridge No    Refillable Tank No    Pre-filled Pod No           Tobacco cessation counseling for <3 minutes.      CAGE Alcohol Screen:   CAGE screening score of 0 on 6/17/2024, showing low risk of alcohol abuse.      Patient Care Team:  Tee Palafox MD as PCP - General (Internal Medicine)  Ramiro Lambert DO as Consulting Physician (Physical Medicine)  Luiz Paiz MD (NEUROLOGY)    Review of Systems       Objective:   Physical Exam  Constitutional:       Appearance: Normal appearance. He is well-developed. He is obese.   HENT:      Right Ear: Tympanic membrane and ear canal normal.      Left Ear: Tympanic membrane and ear canal normal.      Nose: Nose normal.      Mouth/Throat:      Pharynx: No oropharyngeal exudate or posterior oropharyngeal erythema.   Eyes:      Conjunctiva/sclera: Conjunctivae normal.      Pupils: Pupils are equal, round, and reactive to light.   Neck:      Thyroid: No thyromegaly.      Vascular: No carotid bruit.   Cardiovascular:      Rate and Rhythm: Normal rate and regular rhythm.      Pulses: Normal pulses.      Heart sounds: Normal heart sounds. No murmur heard.  Pulmonary:      Effort: Pulmonary effort is normal.      Breath sounds: Normal breath sounds. No wheezing or rales.   Abdominal:      General: Bowel sounds are normal.      Palpations: Abdomen is soft. There is no mass.      Tenderness: There is no abdominal tenderness.      Hernia: There is no hernia in the left inguinal area.   Genitourinary:     Penis: Normal and circumcised.       Testes: Normal.   Musculoskeletal:      Right lower leg: No edema.      Left lower leg: No edema.   Lymphadenopathy:      Cervical: No cervical adenopathy.   Skin:     General: Skin is warm and dry.      Findings: No rash.   Neurological:      General: No focal deficit present.      Mental Status: He is alert.      Cranial Nerves: No cranial nerve deficit.      Coordination: Coordination normal.   Psychiatric:         Mood and Affect:  Mood normal.         Behavior: Behavior normal.         Thought Content: Thought content normal.         Judgment: Judgment normal.          /78 (BP Location: Right arm, Patient Position: Sitting, Cuff Size: large)   Pulse 67   Ht 6' 2\" (1.88 m)   Wt 260 lb 12.8 oz (118.3 kg)   BMI 33.48 kg/m²  Estimated body mass index is 33.48 kg/m² as calculated from the following:    Height as of this encounter: 6' 2\" (1.88 m).    Weight as of this encounter: 260 lb 12.8 oz (118.3 kg).    Medicare Hearing Assessment:   Hearing Screening    Time taken: 6/17/2024  2:42 PM  Entry User: Dayna Saez LPN  Screening Method: Finger Rub  Finger Rub Result: Pass         Visual Acuity:   Right Eye Visual Acuity: Corrected Right Eye Chart Acuity: 20/70   Left Eye Visual Acuity: Corrected Left Eye Chart Acuity: 20/50   Both Eyes Visual Acuity: Corrected Both Eyes Chart Acuity: 20/50   Able To Tolerate Visual Acuity: Yes        Assessment & Plan:   Blue Acevedo is a 64 year old male who presents for a Medicare Assessment.     1. Encounter for annual health examination  Physical exam remarkable for obesity and difficulty in getting around due to musculoskeletal issues.  Active issues as below.  Health maintenance issues reviewed.  Advanced directives reviewed.  Offered patient pneumonia shot today.  He declines.  He is aware of the risk.  Also discussed shingles shot and COVID shot which she can get at the pharmacy.    2. Essential hypertension with goal blood pressure less than 140/90  Well-controlled.  Continue current treatment.  Work on diet, exercise, weight loss.  We will check fasting blood work.  Because of the concerns about his tremors and side effects of the medications, we will cut back on the propranolol ER from 160 down to 80 mg a day.  Monitor blood pressure and contact us with an update.  Follow-up in 6 months.  - Comp Metabolic Panel (14); Future  - Lipid Panel; Future  - TSH W Reflex To Free T4;  Future  - Vitamin B12; Future    3. Lumbar disc disease  Stable.  Continue current medications including the duloxetine.    4. Tobacco use disorder  Strongly encouraged smoking cessation.  Patient had CT scan of lungs last November.    5. Coronary artery disease involving native coronary artery of native heart, unspecified whether angina present  Asymptomatic.  Continue current treatment.  Follow-up with cardiology as needed.    6. Pulmonary nodule  Noted on CT scan of chest in November.  Discussed this with the patient today.  Needs a repeat done towards the end of the year.  We will order this when he returns for his visit in 6 months.    7. Essential tremor  Not terribly active on exam.  Patient is concerned about possible effect from the propranolol that may be contrary to what 1 would expect.  We will give a trial of a lower dose of propranolol going from 160 down to 80 of the long-acting formulation.  Contact us with an update in the next 2 to 4 weeks.    8. Abdominal aortic aneurysm (AAA) without rupture, unspecified part (HCC)  Stable.  Continue to monitor.  Check later in the year.    9. Screening for prostate cancer  Check PSA.  - PSA Total, Screen; Future    10. Colon cancer screening  Patient declines colonoscopy.  Given order for FIT testing of the stool.  - Occult Blood, Fecal, FIT Immunoassay; Future    11. Benign neoplasm of skin, unspecified location  Lesions on the face particularly concerning for the patient is the one on the lower left eyelid.  Sometimes it crusts and bleeds.  Refer to dermatology for evaluation.  - Derm Referral - [facility] ([facility])    The patient indicates understanding of these issues and agrees to the plan.  Reinforced healthy diet, lifestyle, and exercise.      No follow-ups on file.     Tee Palafox MD, 6/17/2024     Supplementary Documentation:   General Health:  In the past six months, have you lost more than 10 pounds without trying?: 2 - No  Has your appetite been  poor?: No  Type of Diet: Balanced  How does the patient maintain a good energy level?: Other  How would you describe your daily physical activity?: Light  How would you describe your current health state?: Fair  How do you maintain positive mental well-being?: Social Interaction;Visiting Friends  On a scale of 0 to 10, with 0 being no pain and 10 being severe pain, what is your pain level?: 4 - (Moderate)  In the past six months, have you experienced urine leakage?: 0-No  At any time do you feel concerned for the safety/well-being of yourself and/or your children, in your home or elsewhere?: No  Have you had any immunizations at another office such as Influenza, Hepatitis B, Tetanus, or Pneumococcal?: No        Blue Acevedo's SCREENING SCHEDULE   Tests on this list are recommended by your physician but may not be covered, or covered at this frequency, by your insurer.   Please check with your insurance carrier before scheduling to verify coverage.   PREVENTATIVE SERVICES FREQUENCY &  COVERAGE DETAILS LAST COMPLETION DATE   Diabetes Screening    Fasting Blood Sugar / Glucose    One screening every 12 months if never tested or if previously tested but not diagnosed with pre-diabetes   One screening every 6 months if diagnosed with pre-diabetes Lab Results   Component Value Date     (H) 05/01/2024        Cardiovascular Disease Screening    Lipid Panel  Cholesterol  Lipoprotein (HDL)  Triglycerides Covered every 5 years for all Medicare beneficiaries without apparent signs or symptoms of cardiovascular disease Lab Results   Component Value Date    CHOLEST 115 06/07/2023    HDL 36 (L) 06/07/2023    LDL 47 06/07/2023    TRIG 194 (H) 06/07/2023         Electrocardiogram (EKG)   Covered if needed at Welcome to Medicare, and non-screening if indicated for medical reasons 10/05/2021      Ultrasound Screening for Abdominal Aortic Aneurysm (AAA) Covered once in a lifetime for one of the following risk factors     Men who are 65-75 years old and have ever smoked    Anyone with a family history -     Colorectal Cancer Screening  Covered for ages 50-85; only need ONE of the following:    Colonoscopy   Covered every 10 years    Covered every 2 years if patient is at high risk or previous colonoscopy was abnormal -    Health Maintenance   Topic Date Due    Colorectal Cancer Screening  Never done       Flexible Sigmoidoscopy   Covered every 4 years -    Fecal Occult Blood Test Covered annually -   Prostate Cancer Screening    Prostate-Specific Antigen (PSA) Annually Lab Results   Component Value Date    PSA 1.9 10/27/2018     Health Maintenance   Topic Date Due    PSA  12/22/2024      Immunizations    Influenza Covered once per flu season  Please get every year -  No recommendations at this time    Pneumococcal Each vaccine ([id number] & [id number]) covered once after 65 Prevnar 13: -    [id number]: -     Pneumococcal Vaccination(1 of 2 - PCV) Never done    Hepatitis B One screening covered for patients with certain risk factors   -  No recommendations at this time    Tetanus Toxoid Not covered by Medicare Part B unless medically necessary (cut with metal); may be covered with your pharmacy prescription benefits -    Tetanus, Diptheria and Pertusis TD and TDaP Not covered by Medicare Part B -  No recommendations at this time    Zoster Not covered by Medicare Part B; may be covered with your pharmacy  prescription benefits -  Zoster Vaccines(1 of 2) Never done     Annual Monitoring of Persistent Medications (ACE/ARB, digoxin diuretics, anticonvulsants)    Potassium Annually Lab Results   Component Value Date    K 3.7 05/01/2024         Creatinine   Annually Lab Results   Component Value Date    CREATSERUM 1.05 05/01/2024         BUN Annually Lab Results   Component Value Date    BUN 17 05/01/2024       Drug Serum Conc Annually No results found for: \"DIGOXIN\", \"DIG\", \"VALP\"

## 2024-06-17 NOTE — PATIENT INSTRUCTIONS
Blue Acevedo's SCREENING SCHEDULE   Tests on this list are recommended by your physician but may not be covered, or covered at this frequency, by your insurer.   Please check with your insurance carrier before scheduling to verify coverage.   PREVENTATIVE SERVICES FREQUENCY &  COVERAGE DETAILS LAST COMPLETION DATE   Diabetes Screening    Fasting Blood Sugar / Glucose    One screening every 12 months if never tested or if previously tested but not diagnosed with pre-diabetes   One screening every 6 months if diagnosed with pre-diabetes Lab Results   Component Value Date     (H) 05/01/2024        Cardiovascular Disease Screening    Lipid Panel  Cholesterol  Lipoprotein (HDL)  Triglycerides Covered every 5 years for all Medicare beneficiaries without apparent signs or symptoms of cardiovascular disease Lab Results   Component Value Date    CHOLEST 115 06/07/2023    HDL 36 (L) 06/07/2023    LDL 47 06/07/2023    TRIG 194 (H) 06/07/2023         Electrocardiogram (EKG)   Covered if needed at Welcome to Medicare, and non-screening if indicated for medical reasons 10/05/2021      Ultrasound Screening for Abdominal Aortic Aneurysm (AAA) Covered once in a lifetime for one of the following risk factors   • Men who are 65-75 years old and have ever smoked   • Anyone with a family history -     Colorectal Cancer Screening  Covered for ages 50-85; only need ONE of the following:    Colonoscopy   Covered every 10 years    Covered every 2 years if patient is at high risk or previous colonoscopy was abnormal -    Health Maintenance   Topic Date Due   • Colorectal Cancer Screening  Never done       Flexible Sigmoidoscopy   Covered every 4 years -    Fecal Occult Blood Test Covered annually -   Prostate Cancer Screening    Prostate-Specific Antigen (PSA) Annually Lab Results   Component Value Date    PSA 1.9 10/27/2018     Health Maintenance   Topic Date Due   • PSA  12/22/2024      Immunizations    Influenza Covered once  per flu season  Please get every year -  No recommendations at this time    Pneumococcal Each vaccine (Henmvhz19 & [id number]) covered once after 65 Prevnar 13: -    [id number]: -     Pneumococcal Vaccination(1 of 2 - PCV) Never done    Hepatitis B One screening covered for patients with certain risk factors   -  No recommendations at this time    Tetanus Toxoid Not covered by Medicare Part B unless medically necessary (cut with metal); may be covered with your pharmacy prescription benefits -    Tetanus, Diptheria and Pertusis TD and TDaP Not covered by Medicare Part B -  No recommendations at this time    Zoster Not covered by Medicare Part B; may be covered with your pharmacy  prescription benefits -  Zoster Vaccines(1 of 2) Never done     Annual Monitoring of Persistent Medications (ACE/ARB, digoxin diuretics, anticonvulsants)    Potassium Annually Lab Results   Component Value Date    K 3.7 05/01/2024         Creatinine   Annually Lab Results   Component Value Date    CREATSERUM 1.05 05/01/2024         BUN Annually Lab Results   Component Value Date    BUN 17 05/01/2024       Drug Serum Conc Annually No results found for: \"DIGOXIN\", \"DIG\", \"VALP\"

## 2024-06-20 ENCOUNTER — HOSPITAL ENCOUNTER (OUTPATIENT)
Dept: MRI IMAGING | Age: 64
Discharge: HOME | End: 2024-06-20
Attending: PHYSICAL MEDICINE & REHABILITATION

## 2024-06-20 ENCOUNTER — APPOINTMENT (OUTPATIENT)
Dept: GENERAL RADIOLOGY | Age: 64
End: 2024-06-20
Attending: PHYSICAL MEDICINE & REHABILITATION

## 2024-06-20 DIAGNOSIS — S76.112S QUADRICEPS TENDON RUPTURE, LEFT, SEQUELA: ICD-10-CM

## 2024-06-20 PROCEDURE — 73721 MRI JNT OF LWR EXTRE W/O DYE: CPT | Performed by: PHYSICAL MEDICINE & REHABILITATION

## 2024-06-24 ENCOUNTER — TELEPHONE (OUTPATIENT)
Dept: PHYSICAL MEDICINE AND REHAB | Facility: CLINIC | Age: 64
End: 2024-06-24

## 2024-06-24 NOTE — TELEPHONE ENCOUNTER
S/W patient to provide Dr. Lambert's results notes below. Advised to follow the treatment plan and follow up as per instruction from last office visit.     Pt  reports he is not currently in PT due to financial reasons, currently has expenses from recent cardiac needs as the PT 2-3 times per week woud add up fast.  This RN advised patient that if he is able to get started, and unable to do twice weekly, that he can stretch the visits out to at least get started and perform HEP as an option.  Patient verbalized understanding and stated he will [place] schedule his follow up appointment .      LOV: 4/18/2024 w/ Dr. Lambert  LOV PLAN:  PT to correct abnormal postures & movement patterns  Duloxetine 20 mg daily  Follow up w/ neuro  MRI of left knee (done)  F/U in 2 months (no appointment scheduled at this time)        ----- Message from Ramiro Lambert sent at 6/23/2024  6:38 AM CDT -----  MRI left knee reviewed; post-op changes along the quadriceps tendon, no tear. Mild osteoarthritis.

## 2024-06-26 ENCOUNTER — TELEPHONE (OUTPATIENT)
Dept: INTERNAL MEDICINE CLINIC | Facility: CLINIC | Age: 64
End: 2024-06-26

## 2024-06-26 NOTE — TELEPHONE ENCOUNTER
Patient due for his colonoscopy, [employer] letter generated and mailed to patients home address listed in EMR.

## 2024-08-02 ENCOUNTER — LAB ENCOUNTER (OUTPATIENT)
Dept: LAB | Age: 64
End: 2024-08-02
Attending: INTERNAL MEDICINE
Payer: MEDICARE

## 2024-08-02 DIAGNOSIS — I10 ESSENTIAL HYPERTENSION WITH GOAL BLOOD PRESSURE LESS THAN 140/90: ICD-10-CM

## 2024-08-02 DIAGNOSIS — Z12.5 SCREENING FOR PROSTATE CANCER: ICD-10-CM

## 2024-08-02 LAB
ALBUMIN SERPL-MCNC: 4.9 G/DL (ref 3.2–4.8)
ALBUMIN/GLOB SERPL: 1.8 {RATIO} (ref 1–2)
ALP LIVER SERPL-CCNC: 57 U/L
ALT SERPL-CCNC: 29 U/L
ANION GAP SERPL CALC-SCNC: 4 MMOL/L (ref 0–18)
AST SERPL-CCNC: 21 U/L (ref ?–34)
BILIRUB SERPL-MCNC: 0.6 MG/DL (ref 0.2–1.1)
BUN BLD-MCNC: 18 MG/DL (ref 9–23)
BUN/CREAT SERPL: 17.3 (ref 10–20)
CALCIUM BLD-MCNC: 10 MG/DL (ref 8.7–10.4)
CHLORIDE SERPL-SCNC: 106 MMOL/L (ref 98–112)
CHOLEST SERPL-MCNC: 127 MG/DL (ref ?–200)
CO2 SERPL-SCNC: 31 MMOL/L (ref 21–32)
COMPLEXED PSA SERPL-MCNC: 1.91 NG/ML (ref ?–4)
CREAT BLD-MCNC: 1.04 MG/DL
EGFRCR SERPLBLD CKD-EPI 2021: 80 ML/MIN/1.73M2 (ref 60–?)
FASTING PATIENT LIPID ANSWER: YES
FASTING STATUS PATIENT QL REPORTED: YES
GLOBULIN PLAS-MCNC: 2.7 G/DL (ref 2–3.5)
GLUCOSE BLD-MCNC: 128 MG/DL (ref 70–99)
HDLC SERPL-MCNC: 32 MG/DL (ref 40–59)
LDLC SERPL CALC-MCNC: 65 MG/DL (ref ?–100)
NONHDLC SERPL-MCNC: 95 MG/DL (ref ?–130)
OSMOLALITY SERPL CALC.SUM OF ELEC: 296 MOSM/KG (ref 275–295)
POTASSIUM SERPL-SCNC: 4.2 MMOL/L (ref 3.5–5.1)
PROT SERPL-MCNC: 7.6 G/DL (ref 5.7–8.2)
SODIUM SERPL-SCNC: 141 MMOL/L (ref 136–145)
TRIGL SERPL-MCNC: 178 MG/DL (ref 30–149)
TSI SER-ACNC: 4.56 MIU/ML (ref 0.55–4.78)
VIT B12 SERPL-MCNC: 484 PG/ML (ref 211–911)
VLDLC SERPL CALC-MCNC: 27 MG/DL (ref 0–30)

## 2024-08-02 PROCEDURE — 82607 VITAMIN B-12: CPT

## 2024-08-02 PROCEDURE — 80053 COMPREHEN METABOLIC PANEL: CPT

## 2024-08-02 PROCEDURE — 80061 LIPID PANEL: CPT

## 2024-08-02 PROCEDURE — 36415 COLL VENOUS BLD VENIPUNCTURE: CPT

## 2024-08-02 PROCEDURE — 84443 ASSAY THYROID STIM HORMONE: CPT

## 2024-10-14 ENCOUNTER — OFFICE VISIT (OUTPATIENT)
Dept: DERMATOLOGY CLINIC | Facility: CLINIC | Age: 64
End: 2024-10-14

## 2024-10-14 DIAGNOSIS — D49.2 NEOPLASM OF UNSPECIFIED BEHAVIOR OF BONE, SOFT TISSUE, AND SKIN: ICD-10-CM

## 2024-10-14 DIAGNOSIS — L81.4 LENTIGINES: Primary | ICD-10-CM

## 2024-10-14 DIAGNOSIS — L57.0 MULTIPLE ACTINIC KERATOSES: ICD-10-CM

## 2024-10-14 PROCEDURE — 82274 ASSAY TEST FOR BLOOD FECAL: CPT

## 2024-10-14 PROCEDURE — 17003 DESTRUCT PREMALG LES 2-14: CPT | Performed by: STUDENT IN AN ORGANIZED HEALTH CARE EDUCATION/TRAINING PROGRAM

## 2024-10-14 PROCEDURE — 88305 TISSUE EXAM BY PATHOLOGIST: CPT | Performed by: STUDENT IN AN ORGANIZED HEALTH CARE EDUCATION/TRAINING PROGRAM

## 2024-10-14 PROCEDURE — 11102 TANGNTL BX SKIN SINGLE LES: CPT | Performed by: STUDENT IN AN ORGANIZED HEALTH CARE EDUCATION/TRAINING PROGRAM

## 2024-10-14 PROCEDURE — 99203 OFFICE O/P NEW LOW 30 MIN: CPT | Performed by: STUDENT IN AN ORGANIZED HEALTH CARE EDUCATION/TRAINING PROGRAM

## 2024-10-14 PROCEDURE — 17000 DESTRUCT PREMALG LESION: CPT | Performed by: STUDENT IN AN ORGANIZED HEALTH CARE EDUCATION/TRAINING PROGRAM

## 2024-10-14 NOTE — PROGRESS NOTES
New Patient    Referred by: Tee Palafox MD     CHIEF COMPLAINT: Lesion(s) of concern     HISTORY OF PRESENT ILLNESS: Blue Acevedo is a 64 year old male here for evaluation of lesion of concern.    1. Growth   Location: Lower Back, L eyelid, Upper Lip   Duration: Years   Signs and symptoms: Some Bleeding to L Eyelid lesion   Current treatment: None   Past treatments: None       Personal Dermatologic History  History of skin cancer: No  History of  atypical moles: No    FAMILY HISTORY:  Unknown pt was adopted       Past Medical History  Past Medical History:    Bilateral primary osteoarthritis of hip    Cold with flu    Type B Influenza - began antibiotic on 3/25/15; no fever since 3/28/15    DVT (deep venous thrombosis) (HCC)    per NG: \"(?assoc with vein valve dysfunction?)\" - Coumadin for 3 months    Essential hypertension with goal blood pressure less than 140/90    Extrinsic asthma, unspecified    triggered by enviromental allergies    H/O blood clots    L3-4 central and bilateral foraminal stenosis    Lumbar facet arthropathy       REVIEW OF SYSTEMS:  Constitutional: Denies fever, chills, unintentional weight loss.   Skin as per HPI    Medications  Current Outpatient Medications   Medication Sig Dispense Refill    Meloxicam 15 MG Oral Tab Take 1 tablet (15 mg total) by mouth daily. 30 tablet 5    Propranolol HCl ER 80 MG Oral Capsule SR 24 Hr Take 1 capsule (80 mg total) by mouth daily. 90 capsule 1    DULoxetine 20 MG Oral Cap DR Particles Take 1 capsule (20 mg total) by mouth daily. 30 capsule 1    clopidogrel 75 MG Oral Tab Take 1 tablet (75 mg total) by mouth daily. 90 tablet 3    amLODIPine 10 MG Oral Tab Take 1 tablet (10 mg total) by mouth daily. 90 tablet 3    losartan-hydroCHLOROthiazide 100-25 MG Oral Tab Take 1 tablet by mouth daily. 90 tablet 3    ROSUVASTATIN 40 MG Oral Tab TAKE 1 TABLET BY MOUTH NIGHTLY 30 tablet 0    Multiple Vitamin (MULTI-VITAMIN DAILY) Oral Tab Take by mouth.       aspirin 81 MG Oral Tab EC Take 1 tablet (81 mg total) by mouth daily.      GLUCOSAMINE CHONDROITIN COMPLX OR Take 750 mg by mouth.      Elastic Bandages & Supports (JOBST RELIEF 30-40MMHG LARGE) Does not apply Misc AS DIRECTED  99    Ascorbic Acid (VITAMIN C) 250 MG Oral Tab Take 1 tablet (250 mg total) by mouth daily.      B Complex Vitamins (B-COMPLEX/B-12 TR) Oral Tab CR Take  by mouth.         PHYSICAL EXAM:  General: awake, alert, no acute distress  Neuropsych: appropriate mood and affect  Eyes: Sclerae anicteric, without conjunctival injection, eyelids unremarkable  Skin: Skin exam was performed today including the following: face. Pertinent findings include:   - with stellate brown macules  - with 2 pink gritty papules on forehead  - L lower eyelid with pink papule    ASSESSMENT & PLAN:  Pathophysiology of diagnoses discussed with patient.  Therapeutic options reviewed. Risks, benefits, and alternatives discussed with patient. Instructions reviewed at length.    #Multiple actinic keratoses  - Discussed premalignant etiology and possibility of transformation to SCC  - Recommended cryotherapy today   - Discussed side effects including redness, swelling, crusting, and discolortion after treatment, wound care with soap/water and vaseline   - Recommend sun protection with spf 30 or higher, sun protective clothing such as wide brimmed hats and long sleeves. Recommend avoiding midday sun (10 am- 3 pm).     - Procedure Note Cryosurgery of pre-malignant lesion(s)  Risks, benefits, alternatives, complications, and personnel required for cryosurgery reviewed with patient. Patient verbalizes understanding and wishes to proceed.   - Cryosurgery performed with Liquid Nitrogen via cryostat spray gun to Actinic Keratosis . 2 lesion(s) treated.   - Patient tolerated well and wound care discussed. Return if lesions fail to fully resolve.    #Lentigines  - Discussed benign appearance and provided reassurance. No treatment but  observation at this time. Follow-up for concerning physical changes or new symptoms.  - Recommend sun protection with spf 30 or higher, sun protective clothing such as wide brimmed hats and long sleeves. Recommend avoiding midday sun (10 am- 3 pm).     #Neoplasm(s) of uncertain behavior of skin  - Shave biopsy performed today   - Will notify patient with results and arrange for appropriate definitive treatment, if indicated.      Shave of lesion to establish and confirm diagnosis:  Photo taken: Yes    Risks, benefits, alternatives and personnel required for shave biopsy reviewed with patient. Risks discussed include, but not limited to: pain, bleeding, infection, scar, reaction to anesthetic, and recurrence/need for further treatment.  Patient and physician agree as to site(s) to be biopsied. Patient verbalizes understanding and wishes to proceed.     Site(s) prepped with alcohol and anesthetized with 1% lidocaine with epinephrine.   Shave of lesion(s) performed to the level of the dermis. Specimen(s) from A. L lower eyelid  sent for pathology to r/o NMSC 50% ALCL and bandaging applied.   Written and verbal wound care instructions provided to patient, understanding verbalized.      Return to clinic: 6 months for FBSE or sooner if something concerning arises     Antwan Gauthier MD

## 2024-10-21 RX ORDER — LOSARTAN POTASSIUM AND HYDROCHLOROTHIAZIDE 25; 100 MG/1; MG/1
1 TABLET ORAL DAILY
Qty: 90 TABLET | Refills: 3 | Status: SHIPPED | OUTPATIENT
Start: 2024-10-21

## 2024-10-21 NOTE — TELEPHONE ENCOUNTER
Refill passed per [facility] protocol.  Requested Prescriptions   Pending Prescriptions Disp Refills    LOSARTAN-HYDROCHLOROTHIAZIDE 100-25 MG Oral Tab [Pharmacy Med Name: LOSARTAN-HYDROCHLOROTHIAZIDE 100-25 MG TAB] 90 tablet 0     Sig: Take 1 tablet by mouth daily.       Hypertension Medications Protocol Passed - 10/21/2024  3:00 PM        Passed - CMP or BMP in past 12 months        Passed - Last BP reading less than 140/90     BP Readings from Last 1 Encounters:   06/17/24 128/78               Passed - In person appointment or virtual visit in the past 12 mos or appointment in next 3 mos     Recent Outpatient Visits              1 week ago Lentigines [facility] [address], [place] Antwan Gauthier MD    Office Visit    4 months ago Encounter for annual health examination    [facility] Tee Palafox MD    Office Visit    6 months ago Lumbar facet arthropathy    [facility] Ramiro Lambert DO    Office Visit    1 year ago Essential hypertension with goal blood pressure less than 140/90    [facility] Tee Palafox MD    Office Visit    1 year ago Essential tremor    [facility] Luiz Paiz MD    Office Visit          Future Appointments         Provider Department Appt Notes    In 2 months Tee Palafox MD [facility] Return in about 6 months (around 12/17/2024).    In 5 months Antwan Gauthier MD [facility] [address], [place] 6 month f/u                    Passed - EGFRCR or GFRNAA > 50     GFR Evaluation  EGFRCR: 80 , resulted on 8/2/2024             Recent Outpatient Visits              1 week ago Lentigines [facility] [address], [place] Antwan Gauthier MD    Office Visit    4 months ago Encounter for  annual health examination    [facility] Tee Palafox MD    Office Visit    6 months ago Lumbar facet arthropathy    [facility] Ramiro Lambert DO    Office Visit    1 year ago Essential hypertension with goal blood pressure less than 140/90    [facility] Tee Palafox MD    Office Visit    1 year ago Essential tremor    [facility] Luiz Paiz MD    Office Visit          Future Appointments         Provider Department Appt Notes    In 2 months Tee Palafox MD [facility] Return in about 6 months (around 12/17/2024).    In 5 months Antwan Gauthier MD [facility] [address], [place] 6 month f/u

## 2024-10-22 ENCOUNTER — LAB ENCOUNTER (OUTPATIENT)
Dept: LAB | Age: 64
End: 2024-10-22
Attending: INTERNAL MEDICINE
Payer: MEDICARE

## 2024-10-22 DIAGNOSIS — Z12.11 COLON CANCER SCREENING: ICD-10-CM

## 2024-10-22 LAB — HEMOCCULT STL QL: NEGATIVE

## 2024-12-23 ENCOUNTER — OFFICE VISIT (OUTPATIENT)
Facility: CLINIC | Age: 64
End: 2024-12-23

## 2024-12-23 VITALS
RESPIRATION RATE: 20 BRPM | TEMPERATURE: 98 F | WEIGHT: 258 LBS | BODY MASS INDEX: 33.11 KG/M2 | DIASTOLIC BLOOD PRESSURE: 70 MMHG | HEIGHT: 74 IN | HEART RATE: 66 BPM | SYSTOLIC BLOOD PRESSURE: 130 MMHG

## 2024-12-23 DIAGNOSIS — M51.9 LUMBAR DISC DISEASE: ICD-10-CM

## 2024-12-23 DIAGNOSIS — I10 ESSENTIAL HYPERTENSION WITH GOAL BLOOD PRESSURE LESS THAN 140/90: Primary | ICD-10-CM

## 2024-12-23 DIAGNOSIS — R91.1 PULMONARY NODULE: ICD-10-CM

## 2024-12-23 DIAGNOSIS — G25.0 ESSENTIAL TREMOR: ICD-10-CM

## 2024-12-23 DIAGNOSIS — I77.810 DILATED AORTIC ROOT (HCC): ICD-10-CM

## 2024-12-23 DIAGNOSIS — F17.200 TOBACCO USE DISORDER: ICD-10-CM

## 2024-12-23 DIAGNOSIS — I71.40 ABDOMINAL AORTIC ANEURYSM (AAA) WITHOUT RUPTURE, UNSPECIFIED PART (HCC): ICD-10-CM

## 2024-12-23 NOTE — PROGRESS NOTES
HPI:    Patient ID: Blue Acevedo is a 64 year old male.    HPI    Patient returns to the office today to discuss chronic medical issues as listed on the active problem list below.  Patient last seen in the office by me on June 17 for Medicare annual wellness visit.  Doing reasonly well at that time.  During the last visit, the following changes were made: Decrease propranolol from 160 down to 80 mg a day.  This was due to patient's concern about its effect and possibly a inverse effect on his tremors.  He was also advised to quit smoking.  Since the last visit, the patient has seen the following doctors: Dermatology.  Full blood work done on August 2.  Elevated blood sugar as well as triglycerides..  Patient is due for repeat chest CT because of pulmonary nodule.    Today, the patient offers the following complaints: He is doing not bad. Physiatry has requested he do PT; he has not followed up on that; he was having some financial issues at the time. Sister has been diagnosed cancer recently- breast and something in the eye. Patient does check blood pressure at home. It has been running around 125-128/78-85. With decreasing the dose of the propranolol, he noted no difference in BP or in his tremors. Back and leg pain is not bad but not great; worse with driving long distances.   Patient describes diet as reducing his caloric intake overall. Trying to eat more fish; also more turkey and chicken; occasional beef or pork.   For exercise, the patient going up and down stairs more; also walks in the [place].   Tobacco and alcohol use reviewed. Still smoking about 1/2 PPD.   Current medications reviewed.   Health maintenance issues reviewed.    Wt Readings from Last 6 Encounters:   12/23/24 258 lb (117 kg)   06/17/24 260 lb 12.8 oz (118.3 kg)   05/01/24 260 lb (117.9 kg)   04/18/24 260 lb (117.9 kg)   04/24/23 260 lb (117.9 kg)   12/13/22 255 lb (115.7 kg)       Patient Active Problem List   Diagnosis     Synovitis and tenosynovitis    Screen for colon cancer    Essential hypertension    Lumbar facet arthropathy    Bilateral primary osteoarthritis of hip    L3-4 central and bilateral foraminal stenosis    PVCs (premature ventricular contractions)    Abnormal cardiovascular stress test    Abdominal aortic aneurysm (AAA) without rupture (HCC)    Smoker    Dyslipidemia        HISTORY:  Past Medical History:    Bilateral primary osteoarthritis of hip    Cold with flu    Type B Influenza - began antibiotic on 3/25/15; no fever since 3/28/15    DVT (deep venous thrombosis) ([facility])    per NG: \"(?assoc with vein valve dysfunction?)\" - Coumadin for 3 months    Essential hypertension with goal blood pressure less than 140/90    Extrinsic asthma, unspecified    triggered by enviromental allergies    H/O blood clots    L3-4 central and bilateral foraminal stenosis    Lumbar facet arthropathy      Past Surgical History:   Procedure Laterality Date    Fix quad/hamstr musc rupt,primary Left 4/6/2015    Procedure: REPAIR QUADRICEPS TENDON;  Surgeon: Rodrick Rodriguez MD;  Location: [facility]    Knee arthroscopy Right 2013    partial lateral meniscectomy    Other surgical history Left 04/06/2015    Left quadriceps tendon repair      No family history on file.   Social History     Socioeconomic History    Marital status:    Tobacco Use    Smoking status: Some Days     Current packs/day: 0.30     Average packs/day: 0.3 packs/day for 40.0 years (12.0 ttl pk-yrs)     Types: Cigarettes    Smokeless tobacco: Never    Tobacco comments:     light smoker   Vaping Use    Vaping status: Never Used   Substance and Sexual Activity    Alcohol use: Yes     Alcohol/week: 6.0 standard drinks of alcohol     Types: 6 Cans of beer per week     Comment: beer - socially (1 drink) only weekend, rarely    Drug use: Yes     Frequency: 0.5 times per week     Types: Cannabis     Comment: almost every day    Sexual activity: Not Currently      Partners: Female   Other Topics Concern    Caffeine Concern Yes     Comment: soda - 3cups/day    Exercise No    Reaction to local anesthetic No    Pt has a pacemaker No    Pt has a defibrillator No   Social History Narrative    The patient does not use an assistive device..      The patient does live in a home with stairs.          Review of Systems          Current Outpatient Medications   Medication Sig Dispense Refill    losartan-hydroCHLOROthiazide 100-25 MG Oral Tab Take 1 tablet by mouth daily. 90 tablet 3    Meloxicam 15 MG Oral Tab Take 1 tablet (15 mg total) by mouth daily. 30 tablet 5    Propranolol HCl ER 80 MG Oral Capsule SR 24 Hr Take 1 capsule (80 mg total) by mouth daily. 90 capsule 1    DULoxetine 20 MG Oral Cap DR Particles Take 1 capsule (20 mg total) by mouth daily. 30 capsule 1    clopidogrel 75 MG Oral Tab Take 1 tablet (75 mg total) by mouth daily. 90 tablet 3    amLODIPine 10 MG Oral Tab Take 1 tablet (10 mg total) by mouth daily. 90 tablet 3    ROSUVASTATIN 40 MG Oral Tab TAKE 1 TABLET BY MOUTH NIGHTLY 30 tablet 0    Multiple Vitamin (MULTI-VITAMIN DAILY) Oral Tab Take by mouth.      aspirin 81 MG Oral Tab EC Take 1 tablet (81 mg total) by mouth daily.      GLUCOSAMINE CHONDROITIN COMPLX OR Take 750 mg by mouth.      Elastic Bandages & Supports (JOBST RELIEF 30-40MMHG LARGE) Does not apply Misc AS DIRECTED  99    Ascorbic Acid (VITAMIN C) 250 MG Oral Tab Take 1 tablet (250 mg total) by mouth daily.      B Complex Vitamins (B-COMPLEX/B-12 TR) Oral Tab CR Take  by mouth.       Allergies:Allergies[1]     PHYSICAL EXAM:   /70 (BP Location: Left arm, Patient Position: Sitting, Cuff Size: large)   Pulse 66   Temp 98.2 °F (36.8 °C) (Other)   Resp 20   Ht 6' 2\" (1.88 m)   Wt 258 lb (117 kg)   BMI 33.13 kg/m²      Physical Exam  Constitutional:       Appearance: Normal appearance. He is well-developed.   HENT:      Nose: Nose normal.      Mouth/Throat:      Pharynx: No oropharyngeal  exudate or posterior oropharyngeal erythema.   Eyes:      Conjunctiva/sclera: Conjunctivae normal.   Neck:      Vascular: No carotid bruit.   Cardiovascular:      Rate and Rhythm: Normal rate and regular rhythm.      Pulses: Normal pulses.      Heart sounds: Normal heart sounds. No murmur heard.  Pulmonary:      Effort: Pulmonary effort is normal.      Breath sounds: Normal breath sounds. No wheezing or rales.   Abdominal:      General: Bowel sounds are normal.      Palpations: Abdomen is soft. There is no mass.      Tenderness: There is no abdominal tenderness.   Musculoskeletal:      Right lower leg: No edema.      Left lower leg: No edema.   Lymphadenopathy:      Cervical: No cervical adenopathy.   Skin:     General: Skin is warm and dry.      Findings: No rash.   Neurological:      General: No focal deficit present.      Mental Status: He is alert.   Psychiatric:         Mood and Affect: Mood normal.         Behavior: Behavior normal.         Thought Content: Thought content normal.                 ASSESSMENT/PLAN:   1. Essential hypertension with goal blood pressure less than 140/90  Blood pressure is well-controlled.  The lower dose of propranolol does not seem to have an adverse effect.  Continue same medications.  Work on diet, exercise, weight loss.  Follow-up in 6 months for Medicare annual visit.    2. Lumbar disc disease  Still present.  Continue current treatment and follow-up with specialist.    3. Tobacco use disorder  Still smoking a half a pack of cigarettes a day.  Strongly encouraged to work on further decrease if not complete cessation.    4. Pulmonary nodule  Noted on previous CT scan.  Repeat CT scan given for the nodule as well as the aortic aneurysm.  - CTA GATED THORACIC AORTA (CPT=71275); Future    5. Essential tremor  No better or worse with decreasing the dose of the propranolol.  Continue same dose of medication.  Mild head tremor on exam.    6. Abdominal aortic aneurysm (AAA) without  rupture, unspecified part (HCC)  Asymptomatic.  Check CT scan.  - CTA GATED THORACIC AORTA (CPT=71275); Future    7. Dilated aortic root (HCC)  Asymptomatic.  Check same CT scan that was done 1 year ago.  Follow-up with specialist pending results.  He has not followed up with the specialist recently.  He had some issues with the level of service they provided previously.  - CTA GATED THORACIC AORTA (CPT=71275); Future         Meds This Visit:  Requested Prescriptions      No prescriptions requested or ordered in this encounter       Imaging & Referrals:  None         Tee Palafox MD        [1]   Allergies  Allergen Reactions    Pollen Runny nose

## 2025-03-21 RX ORDER — PROPRANOLOL HYDROCHLORIDE 80 MG/1
80 CAPSULE, EXTENDED RELEASE ORAL DAILY
Qty: 90 CAPSULE | Refills: 3 | Status: SHIPPED | OUTPATIENT
Start: 2025-03-21

## 2025-03-27 ENCOUNTER — TELEPHONE (OUTPATIENT)
Dept: PHYSICAL MEDICINE AND REHAB | Facility: CLINIC | Age: 65
End: 2025-03-27

## 2025-03-27 ENCOUNTER — TELEPHONE (OUTPATIENT)
Dept: NEUROLOGY | Facility: CLINIC | Age: 65
End: 2025-03-27

## 2025-03-27 NOTE — TELEPHONE ENCOUNTER
Patient walked into the [facility] office requesting forms to be completed and faxed to   [phone]    PHI and FCR completed and $25 fee collected     Emailed to forms department and sent via inter office envelope

## 2025-03-27 NOTE — TELEPHONE ENCOUNTER
Patient came to provide additional disability forms to be completed by physician. Documents forwarded to the forms department via Email & office mail.

## 2025-03-31 NOTE — TELEPHONE ENCOUNTER
Received Disability Forms via e-mail in the forms department with incomplete authorization. Patient does not have [employer] Active.   Logged for processing.

## 2025-04-01 NOTE — TELEPHONE ENCOUNTER
Abel Lambert,    Patient sent in disability forms for progress report for his long term disability. His last visit with you was April 2024.    He states he has no return to work date at this time.    Do you support the continuation of his long term disability?    Thanks!  -Adilia, Forms

## 2025-04-03 NOTE — TELEPHONE ENCOUNTER
Abel Lambert,     Patient sent in disability forms for progress report for his long term disability. His last visit with you was April 2024.     He states he has no return to work date at this time.     Do you support the continuation of his long term disability?     Thanks,  Nasim

## 2025-04-07 RX ORDER — ROSUVASTATIN CALCIUM 40 MG/1
TABLET, COATED ORAL
Qty: 30 TABLET | Refills: 0 | OUTPATIENT
Start: 2025-04-07

## 2025-04-08 NOTE — TELEPHONE ENCOUNTER
Called patient to advise that appointment is needed prior to completing forms per provider's response. Advised patient the form will be placed on hold until patient is seen by the provider. Told patient to call back to advise us that the appointment was completed.

## 2025-04-10 ENCOUNTER — OFFICE VISIT (OUTPATIENT)
Dept: PHYSICAL MEDICINE AND REHAB | Facility: CLINIC | Age: 65
End: 2025-04-10
Payer: MEDICARE

## 2025-04-10 VITALS
HEIGHT: 74 IN | WEIGHT: 255 LBS | BODY MASS INDEX: 32.73 KG/M2 | DIASTOLIC BLOOD PRESSURE: 66 MMHG | SYSTOLIC BLOOD PRESSURE: 130 MMHG

## 2025-04-10 DIAGNOSIS — M47.816 LUMBAR FACET ARTHROPATHY: Primary | ICD-10-CM

## 2025-04-10 DIAGNOSIS — E78.00 PURE HYPERCHOLESTEROLEMIA, UNSPECIFIED: ICD-10-CM

## 2025-04-10 DIAGNOSIS — M67.969 TENDINOPATHY OF KNEE: ICD-10-CM

## 2025-04-10 DIAGNOSIS — I10 ESSENTIAL (PRIMARY) HYPERTENSION: ICD-10-CM

## 2025-04-10 PROCEDURE — 99214 OFFICE O/P EST MOD 30 MIN: CPT | Performed by: PHYSICAL MEDICINE & REHABILITATION

## 2025-04-10 NOTE — PROGRESS NOTES
The following individual(s) verbally consented to be recorded using ambient AI listening technology and understand that they can each withdraw their consent to this listening technology at any point by asking the clinician to turn off or pause the recording:    Patient name: Blue Acevedo     Progress note    C/C:   Chief Complaint   Patient presents with    Follow - Up     LOV 4/18/24. F/U for low back, L knee. Pain 3/10 in low back, 2/10 in knee. Denies N/T, admits weakness/spasms in leg (thigh and groin). No pain meds, pt smokes marijuana when pain is bad. HEP with slight improvement.        History of Present Illness  The patient, a 65-year-old male on long-term disability, presents for a follow-up visit. He has a history of myofascial pain, heart issues, and aortic aneurysms. Since the last visit, the patient underwent a Functional Capacity Evaluation (FCE) which reported capabilities within sedentary physical demand level with the heaviest weight able to lift within the demand five to twenty pounds. The FCE also noted limited tolerance for sitting and standing and poor balance.    The patient was prescribed duloxetine 20mg daily for myofascial pain, but reports only taking it on \"really bad days\" due to stomach discomfort. He also takes meloxicam as needed, which he reports has less of an effect on his stomach.    The patient has been followed by cardiology for a left heart catheterization. He reports difficulty with physical therapy due to his aortic aneurysms. Despite these challenges, the patient remains active, engaging in fishing and walking as forms of physical therapy.    The patient reports new spasms in the left inner thigh, which are more frequent but less severe than previous quadricep spasms. He also reports difficulty standing and walking for extended periods, needing to sit after walking half to three-quarters of a mile. Has not had any quad spasms in about 2-3 months.    The patient  also mentions a history of a reattached quad tendon, which he feels is weak. He reports no pain shooting down the legs from the back. He is on propranolol for tremors, which he reports does not help significantly.    Pertinent allergies: Allergies[1]     Physical exam:  /66   Ht 74\"   Wt 255 lb (115.7 kg)   BMI 32.74 kg/m²      Lumbar spine exam:    No skin rash lumbar/upper sacral region  No pain with lumbar flexion. + pain with lumbar extension.  + tenderness to palpation bilateral lumbar paraspinals. No ttp bilateral PSIS.  5/5 LE strength b/l  2/4 right quad, gastrocs reflexes b/l  Facet loading + bl  Preserved ROM of the left hip in flexion, internal and external rotation, without significant provoked pain    IMAGING: MRI left knee dated 6/20/2024 independently reviewed, as was report.  Tendinosis without a tear of the quad tendon as it inserts into the superior pole of the patella.  Susceptibility artifact just superior from his previous repair.      Assessment & Plan  Chronic low back pain. Lumbar facet arthropathy vs myofascial pain, or both.  He experiences chronic pain with muscle spasms and limited mobility. Duloxetine is used sparingly due to side effects, and Meloxicam is used occasionally. Physical therapy is not pursued due to the aneurysm. Bilateral L4-5, L5-S1 facet joint injections for symptom relief were discussed. Consider joint injections in the lower back to relieve symptoms and improve mobility. Physical therapy may be considered if feasible given the aneurysm.    Aortic Aneurysm    He is under cardiologist care with a follow-up scheduled.    Tremor    Propranolol provides limited relief, and the tremor affects fine motor skills.    Knee Pain and Quadriceps Tendinopathy  MRI shows tendon wear with history of reattachment, causing weakness and instability. He engages in strengthening exercises. Encourage activities that strengthen the quadriceps, such as walking and water-based  exercises.    Long-term Disability    He is on disability due to limited functional capacity and receives benefits until age 67, requiring annual certification. Complete long-term disability paperwork for annual certification.    Ramiro Lambert DO  Physical Medicine and Rehabilitation  [facility]         [1]   Allergies  Allergen Reactions    Pollen Runny nose

## 2025-04-10 NOTE — PROGRESS NOTES
The following individual(s) verbally consented to be recorded using ambient AI listening technology and understand that they can each withdraw their consent to this listening technology at any point by asking the clinician to turn off or pause the recording:    Patient name: Blue Gil Curtis  Additional names:

## 2025-04-10 NOTE — PATIENT INSTRUCTIONS
If wanting to do injections to the joints of the lower back you may contact the clinic and we will get you set up for that. Would be best to have you stop the plavix and continue aspirin if procedure is to be done.

## 2025-04-11 NOTE — TELEPHONE ENCOUNTER
Form completed and e-faxed to [employer], [phone] awaiting confirmation. Confirmation received, called patient to advise form has been completed, faxed and can be picked up at the provider's office.

## 2025-04-11 NOTE — TELEPHONE ENCOUNTER
Received a epic message from provider:    OK for me to sign off on LTD paperwork for this patient. Seen today.     Will complete form and send over for signature.

## 2025-04-11 NOTE — TELEPHONE ENCOUNTER
Good morning Dr. Lambert,     Please sign off on form if you agree to:   Long term disability due to lumbar facet arthropathy & tendinopathy of knee.   -Signature page will be the first page scanned  -From your Inbasket, Highlight the patient and click Chart   -Double click the 03/27/25 Forms Completion telephone encounter  -Scroll down to the Media section   -Click the blue Hyperlink: Dr.Legaspi Palmira, 04/11/25   -Click Acknowledge located in the top right ribbon/menu   -Drag the mouse into the blank space of the document and a + sign will appear. Left click to   electronically sign the document.  -Once signed, simply exit out of the screen and you signature will be saved.     Thank you,  Nasim  Forms Dept.

## 2025-04-14 ENCOUNTER — OFFICE VISIT (OUTPATIENT)
Dept: DERMATOLOGY CLINIC | Facility: CLINIC | Age: 65
End: 2025-04-14

## 2025-04-14 DIAGNOSIS — L81.4 LENTIGINES: ICD-10-CM

## 2025-04-14 DIAGNOSIS — L82.1 SEBORRHEIC KERATOSES: Primary | ICD-10-CM

## 2025-04-14 DIAGNOSIS — D18.01 CHERRY ANGIOMA: ICD-10-CM

## 2025-04-14 PROCEDURE — 99214 OFFICE O/P EST MOD 30 MIN: CPT | Performed by: STUDENT IN AN ORGANIZED HEALTH CARE EDUCATION/TRAINING PROGRAM

## 2025-04-14 RX ORDER — CLOPIDOGREL BISULFATE 75 MG/1
75 TABLET ORAL DAILY
Qty: 90 TABLET | Refills: 3 | Status: SHIPPED | OUTPATIENT
Start: 2025-04-14

## 2025-04-14 NOTE — PROGRESS NOTES
Established patient     Referred by:   No referring provider defined for this encounter.     CHIEF COMPLAINT: FBSE    HISTORY OF PRESENT ILLNESS: .    - No particular lesions of concern.       Personal Dermatologic History  History of skin cancer: No  History of  atypical moles: No     FAMILY HISTORY:  Unknown pt was adopted     PAST MEDICAL HISTORY:  Past Medical History[1]    REVIEW OF SYSTEMS:  Constitutional: Denies fever, chills, unintentional weight loss.   Skin as per HPI    Medications  Current Medications[2]    PHYSICAL EXAM:  Patient declined chaperone   General: awake, alert, no acute distress  Skin: Skin exam was performed today including the following: head and face, scalp, neck, chest (including breasts and axillae), abdomen, back, bilateral upper extremities, bilateral lower extremities, hands, feet, digits, nails. Pertinent findings include:   - Scattered bright red-purple dome-shaped papules on the trunk and extremities   - Scattered light brown stellate macules on sun exposed sites  - Scattered, evenly colored, round brown macules and papules with regular borders on the trunk and extremities  - Numerous scattered skin-colored and brown, waxy, stuck-on papules and plaques on the trunk and extremities  - L ear with an inflamed stuck on papule    ASSESSMENT & PLAN:  Pathophysiology of diagnoses discussed with patient.  Therapeutic options reviewed. Risks, benefits, and alternatives discussed with patient. Instructions reviewed at length.    #Lentigines  #Seborrheic keratoses   #Cherry angiomas   - Reassurance provided regarding the benign nature of these lesions.    #Multiple benign nevi  - Complete skin exam performed today with no outlier lesions identified   - Reassured patient of benign nature of these lesions.   - Recommend daily photoprotection with broad-spectrum sunscreen, avoidance of sun during peak hours, and sun protective clothing.    - Dermoscopy was used for physical examination of  pigmented lesions during today's office visit.    #Inflamed seborrheic keratosis  - Reassured regarding benign nature of lesion   - Cryotherapy today. Medically necessary as lesion inflamed and irritated.    - Cryosurgery of non-malignant lesion(s)  - Risks, benefits, alternatives and personnel required for cryosurgery reviewed with patient. Pt verbalizes understanding and wishes to proceed.   - Cryosurgery performed with Liquid Nitrogen via cryostat spray gun to ISK. 1 lesion(s) treated.   - Patient tolerated well and wound care discussed.       Return to clinic: 1 year  or sooner if something concerning arises     Antwan Gauthier MD         [1]   Past Medical History:   Bilateral primary osteoarthritis of hip    Cold with flu    Type B Influenza - began antibiotic on 3/25/15; no fever since 3/28/15    DVT (deep venous thrombosis) (HCC)    per NG: \"(?assoc with vein valve dysfunction?)\" - Coumadin for 3 months    Essential hypertension with goal blood pressure less than 140/90    Extrinsic asthma, unspecified    triggered by enviromental allergies    H/O blood clots    L3-4 central and bilateral foraminal stenosis    Lumbar facet arthropathy   [2]   Current Outpatient Medications   Medication Sig Dispense Refill    Propranolol HCl ER 80 MG Oral Capsule SR 24 Hr Take 1 capsule (80 mg total) by mouth daily. 90 capsule 3    losartan-hydroCHLOROthiazide 100-25 MG Oral Tab Take 1 tablet by mouth daily. 90 tablet 3    Meloxicam 15 MG Oral Tab Take 1 tablet (15 mg total) by mouth daily. 30 tablet 5    DULoxetine 20 MG Oral Cap DR Particles Take 1 capsule (20 mg total) by mouth daily. 30 capsule 1    clopidogrel 75 MG Oral Tab Take 1 tablet (75 mg total) by mouth daily. 90 tablet 3    amLODIPine 10 MG Oral Tab Take 1 tablet (10 mg total) by mouth daily. 90 tablet 3    ROSUVASTATIN 40 MG Oral Tab TAKE 1 TABLET BY MOUTH NIGHTLY 30 tablet 0    Multiple Vitamin (MULTI-VITAMIN DAILY) Oral Tab Take by mouth.      aspirin 81 MG  Oral Tab EC Take 1 tablet (81 mg total) by mouth daily.      GLUCOSAMINE CHONDROITIN COMPLX OR Take 750 mg by mouth.      Elastic Bandages & Supports (JOBST RELIEF 30-40MMHG LARGE) Does not apply Misc AS DIRECTED  99    Ascorbic Acid (VITAMIN C) 250 MG Oral Tab Take 1 tablet (250 mg total) by mouth daily.      B Complex Vitamins (B-COMPLEX/B-12 TR) Oral Tab CR Take  by mouth.

## 2025-04-14 NOTE — TELEPHONE ENCOUNTER
Please Review. Protocol Failed; No Protocol     Requested Prescriptions   Pending Prescriptions Disp Refills    CLOPIDOGREL 75 MG Oral Tab [Pharmacy Med Name: CLOPIDOGREL 75 MG TABLET] 90 tablet 0     Sig: Take 1 tablet (75 mg total) by mouth daily.       There is no refill protocol information for this order

## 2025-04-19 ENCOUNTER — HOSPITAL ENCOUNTER (OUTPATIENT)
Dept: CT IMAGING | Facility: HOSPITAL | Age: 65
Discharge: HOME | End: 2025-04-19
Attending: INTERNAL MEDICINE
Payer: MEDICARE

## 2025-04-19 DIAGNOSIS — I71.40 ABDOMINAL AORTIC ANEURYSM (AAA) WITHOUT RUPTURE, UNSPECIFIED PART: ICD-10-CM

## 2025-04-19 DIAGNOSIS — I77.810 DILATED AORTIC ROOT: ICD-10-CM

## 2025-04-19 DIAGNOSIS — R91.1 PULMONARY NODULE: ICD-10-CM

## 2025-04-19 LAB
CREAT BLD-MCNC: 1 MG/DL (ref 0.7–1.3)
EGFRCR SERPLBLD CKD-EPI 2021: 84 ML/MIN/1.73M2 (ref 60–?)

## 2025-04-19 PROCEDURE — 82565 ASSAY OF CREATININE: CPT

## 2025-04-19 PROCEDURE — 71275 CT ANGIOGRAPHY CHEST: CPT | Performed by: INTERNAL MEDICINE

## 2025-05-05 RX ORDER — AMLODIPINE BESYLATE 10 MG/1
10 TABLET ORAL DAILY
Qty: 90 TABLET | Refills: 3 | Status: SHIPPED | OUTPATIENT
Start: 2025-05-05

## 2025-06-23 ENCOUNTER — TELEPHONE (OUTPATIENT)
Dept: INTERNAL MEDICINE CLINIC | Facility: CLINIC | Age: 65
End: 2025-06-23

## 2025-06-23 RX ORDER — MELOXICAM 15 MG/1
15 TABLET ORAL
COMMUNITY

## 2025-06-23 NOTE — TELEPHONE ENCOUNTER
COMPREHENSIVE MEDICATION REVIEW         Blue Acevedo MRN [id number]    3/22/1960 PCP Tee Palafox MD     Comments: Medication history completed by Ambulatory Clinic Pharmacist over the phone on 25. Patient has upcoming AWV with PCP on 25.     After thorough medication review, 2 discrepancies have been identified and corrected on patient's medication list. See updated list below:     Outpatient Encounter Medications as of 2025   Medication Sig    Meloxicam 15 MG Oral Tab Take 1 tablet (15 mg total) by mouth daily as needed for Pain.    amLODIPine 10 MG Oral Tab Take 1 tablet (10 mg total) by mouth daily.    clopidogrel 75 MG Oral Tab Take 1 tablet (75 mg total) by mouth daily.    Propranolol HCl ER 80 MG Oral Capsule SR 24 Hr Take 1 capsule (80 mg total) by mouth daily.    losartan-hydroCHLOROthiazide 100-25 MG Oral Tab Take 1 tablet by mouth daily.    ROSUVASTATIN 40 MG Oral Tab TAKE 1 TABLET BY MOUTH NIGHTLY    Multiple Vitamin (MULTI-VITAMIN DAILY) Oral Tab Take 1 tablet by mouth daily.    aspirin 81 MG Oral Tab EC Take 1 tablet (81 mg total) by mouth daily.    GLUCOSAMINE CHONDROITIN COMPLX OR Take 1 tablet by mouth daily.    Ascorbic Acid (VITAMIN C) 250 MG Oral Tab Take 1 tablet (250 mg total) by mouth daily as needed.    B Complex Vitamins (B-COMPLEX/B-12 TR) Oral Tab CR Take 1 tablet by mouth daily as needed.     Medication Assessment:   Reviewed all medications in detail with patient including dose, indication, timing of administration, monitoring parameters, and potential side effects of medications.     Patient reports taking amlodipine 10 mg daily, clopidogrel 75 mg daily, losartan-hydrochlorothiazide 100-25 mg daily, propranolol ER 80 mg daily and rosuvastatin 40 mg daily as prescribed. He will occasionally monitor his blood pressure at home. Did recommend he monitor his blood pressure 2-3 times weekly and bring readings to all MD appointments for review.     Did provide  education and stressed the importance of taking medication just like prescribed to get the most benefit. Patient denies forgetting or missing medication doses and denies any questions or concerns with medications at this time.     Thank you,    Jaqueline Dumont, PharmD, 6/23/2025, 12:31 PM

## 2025-06-25 ENCOUNTER — OFFICE VISIT (OUTPATIENT)
Dept: INTERNAL MEDICINE CLINIC | Facility: CLINIC | Age: 65
End: 2025-06-25

## 2025-06-25 VITALS
OXYGEN SATURATION: 96 % | WEIGHT: 263 LBS | TEMPERATURE: 98 F | DIASTOLIC BLOOD PRESSURE: 69 MMHG | RESPIRATION RATE: 16 BRPM | HEART RATE: 67 BPM | BODY MASS INDEX: 33.75 KG/M2 | HEIGHT: 74 IN | SYSTOLIC BLOOD PRESSURE: 115 MMHG

## 2025-06-25 DIAGNOSIS — Z12.5 SCREENING FOR PROSTATE CANCER: ICD-10-CM

## 2025-06-25 DIAGNOSIS — I71.40 ABDOMINAL AORTIC ANEURYSM (AAA) WITHOUT RUPTURE, UNSPECIFIED PART: ICD-10-CM

## 2025-06-25 DIAGNOSIS — F17.200 TOBACCO USE DISORDER: ICD-10-CM

## 2025-06-25 DIAGNOSIS — M51.9 LUMBAR DISC DISEASE: ICD-10-CM

## 2025-06-25 DIAGNOSIS — E78.5 HYPERLIPIDEMIA, UNSPECIFIED HYPERLIPIDEMIA TYPE: ICD-10-CM

## 2025-06-25 DIAGNOSIS — G25.0 ESSENTIAL TREMOR: ICD-10-CM

## 2025-06-25 DIAGNOSIS — R91.1 PULMONARY NODULE: ICD-10-CM

## 2025-06-25 DIAGNOSIS — I10 ESSENTIAL HYPERTENSION WITH GOAL BLOOD PRESSURE LESS THAN 140/90: ICD-10-CM

## 2025-06-25 DIAGNOSIS — I25.10 CORONARY ARTERY DISEASE INVOLVING NATIVE CORONARY ARTERY OF NATIVE HEART, UNSPECIFIED WHETHER ANGINA PRESENT: ICD-10-CM

## 2025-06-25 DIAGNOSIS — Z00.00 ENCOUNTER FOR ANNUAL HEALTH EXAMINATION: Primary | ICD-10-CM

## 2025-06-25 DIAGNOSIS — I77.810 DILATED AORTIC ROOT: ICD-10-CM

## 2025-06-25 PROCEDURE — 99213 OFFICE O/P EST LOW 20 MIN: CPT | Performed by: INTERNAL MEDICINE

## 2025-06-25 PROCEDURE — G0438 PPPS, INITIAL VISIT: HCPCS | Performed by: INTERNAL MEDICINE

## 2025-06-25 NOTE — PROGRESS NOTES
Subjective:   Blue Acevedo is a 65 year old male who presents for a Medicare Initial Annual Wellness visit (Once after 12 month Medicare anniversary)  and scheduled follow up of multiple significant but stable problems.   History of Present Illness    Patient is here requesting Medicare annual wellness visit and follow-up on chronic medical problems.  Last seen in the office around [place] time of last year.  No changes made.  Since that time he seen physiatry for the back issues as well as dermatology.  It was are about the same.  Continues to have low back pain.  Also with left leg weakness.  Uses a brace on his left knee.  [place] that he can be a little bit cautious about his walking.  Has not had any significant falling.  Blood pressure at home runs around 120s over 70s.  Weight is up a little bit now.  Diet is not terribly specific.  He tries to walk for exercise.  Still has the resting tremors that wax and wane.  Continues to smoke cigarettes but on average about half pack a day.  Sometimes down to 6 a day and then sometimes up to 15 a day.  Patient has CT angiogram done on April 19.  Showed no change in size of thoracic aortic aneurysm or change in the pulmonary nodules.  Current medications reviewed.  Health maintenance and vaccination status reviewed.  Advanced directives reviewed.      History/Other:   Fall Risk Assessment:   He has been screened for Falls and is High Risk. Fall Prevention information provided to patient in After Visit Summary.    Do you feel unsteady when standing or walking?: Yes  Do you worry about falling?: No  Have you fallen in the past year?: Yes  Were you injured?: No     Cognitive Assessment:   He had a completely normal cognitive assessment - see flowsheet entries     Functional Ability/Status:   Blue Acevedo has some abnormal functions as listed below:  He has Walking problems based on screening of functional status. He  has problems with Daily Activities based on screening of functional status.       Depression Screening (PHQ):  PHQ-2 SCORE: 1  , done 6/25/2025   Little interest or pleasure in doing things: 1    If you checked off any problems, how difficult have these problems made it for you to do your work, take care of things at home, or get along with other people?: Not difficult at all    Last [place] Suicide Screening on 6/25/2025 was No Risk.          Advanced Directives:   He does NOT have a Living Will. [Do you have a living will?: No]  He does NOT have a Power of  for Health Care. [Do you have a healthcare power of ?: No]  Discussed Advance Care Planning with patient (and family/surrogate if present). Standard forms made available to patient in After Visit Summary.      Patient Active Problem List   Diagnosis    Synovitis and tenosynovitis    Screen for colon cancer    Essential hypertension    Lumbar facet arthropathy    Bilateral primary osteoarthritis of hip    L3-4 central and bilateral foraminal stenosis    PVCs (premature ventricular contractions)    Abnormal cardiovascular stress test    Abdominal aortic aneurysm (AAA) without rupture    Smoker    Dyslipidemia     Allergies:  He is allergic to pollen.    Current Medications:  Outpatient Medications Marked as Taking for the 6/25/25 encounter (Office Visit) with Tee Palafox MD   Medication Sig    Meloxicam 15 MG Oral Tab Take 1 tablet (15 mg total) by mouth daily as needed for Pain.    amLODIPine 10 MG Oral Tab Take 1 tablet (10 mg total) by mouth daily.    clopidogrel 75 MG Oral Tab Take 1 tablet (75 mg total) by mouth daily.    Propranolol HCl ER 80 MG Oral Capsule SR 24 Hr Take 1 capsule (80 mg total) by mouth daily.    losartan-hydroCHLOROthiazide 100-25 MG Oral Tab Take 1 tablet by mouth daily.    ROSUVASTATIN 40 MG Oral Tab TAKE 1 TABLET BY MOUTH NIGHTLY    Multiple Vitamin (MULTI-VITAMIN DAILY) Oral Tab Take 1 tablet by mouth daily.     aspirin 81 MG Oral Tab EC Take 1 tablet (81 mg total) by mouth daily.    GLUCOSAMINE CHONDROITIN COMPLX OR Take 1 tablet by mouth daily.    Ascorbic Acid (VITAMIN C) 250 MG Oral Tab Take 1 tablet (250 mg total) by mouth daily as needed.    B Complex Vitamins (B-COMPLEX/B-12 TR) Oral Tab CR Take 1 tablet by mouth daily as needed.       Medical History:  He  has a past medical history of Bilateral primary osteoarthritis of hip (12/12/2017), Cold with flu (3/22/15), DVT (deep venous thrombosis) ([facility]) (2007), Essential hypertension with goal blood pressure less than 140/90 (12/8/2016), Extrinsic asthma, unspecified, H/O blood clots, L3-4 central and bilateral foraminal stenosis (1/26/2018), and Lumbar facet arthropathy (12/12/2017).  Surgical History:  He  has a past surgical history that includes knee arthroscopy (Right, 2013); fix quad/hamstr musc rupt,primary (Left, 4/6/2015); and other surgical history (Left, 04/06/2015).   Family History:  His family history is not on file.  Social History:  He  reports that he has been smoking cigarettes. He has a 12 pack-year smoking history. He has never used smokeless tobacco. He reports current alcohol use of about 6.0 standard drinks of alcohol per week. He reports current drug use. Frequency: 0.50 times per week. Drug: Cannabis.    Tobacco:  Social History     Tobacco Use   Smoking Status Some Days    Current packs/day: 0.30    Average packs/day: 0.3 packs/day for 40.0 years (12.0 ttl pk-yrs)    Types: Cigarettes   Smokeless Tobacco Never   Tobacco Comments    light smoker     E-Cigarettes/Vaping       Questions Responses    E-Cigarette Use Never User          E-Cigarette/Vaping Substances       Questions Responses    Nicotine No    THC No    CBD No    Flavoring No          E-Cigarette/Vaping Devices       Questions Responses    Disposable No    Pre-filled or Refillable Cartridge No    Refillable Tank No    Pre-filled Pod No           Tobacco cessation counseling for <3  minutes.      CAGE Alcohol Screen:   CAGE screening score of 0 on 6/25/2025, showing low risk of alcohol abuse.      Patient Care Team:  Tee Palafox MD as PCP - General (Internal Medicine)  Ramiro Lambert DO as Consulting Physician (Physical Medicine)  Luiz Paiz MD (NEUROLOGY)    Review of Systems       Objective:   Physical Exam  Constitutional:       Appearance: Normal appearance. He is well-developed. He is obese.   HENT:      Right Ear: Tympanic membrane and ear canal normal.      Left Ear: Tympanic membrane and ear canal normal.      Nose: Nose normal.      Mouth/Throat:      Pharynx: No oropharyngeal exudate or posterior oropharyngeal erythema.   Eyes:      Conjunctiva/sclera: Conjunctivae normal.      Pupils: Pupils are equal, round, and reactive to light.   Neck:      Thyroid: No thyromegaly.      Vascular: No carotid bruit.   Cardiovascular:      Rate and Rhythm: Normal rate and regular rhythm.      Pulses: Normal pulses.      Heart sounds: Normal heart sounds. No murmur heard.  Pulmonary:      Effort: Pulmonary effort is normal.      Breath sounds: Normal breath sounds. No wheezing or rales.   Abdominal:      General: Bowel sounds are normal.      Palpations: Abdomen is soft. There is no mass.      Tenderness: There is no abdominal tenderness.      Hernia: There is no hernia in the left inguinal area.   Genitourinary:     Penis: Normal and circumcised.       Testes: Normal.   Musculoskeletal:      Right lower leg: No edema.      Left lower leg: No edema.   Lymphadenopathy:      Cervical: No cervical adenopathy.   Skin:     General: Skin is warm and dry.      Findings: No rash.   Neurological:      General: No focal deficit present.      Mental Status: He is alert.      Cranial Nerves: No cranial nerve deficit.      Coordination: Coordination normal.   Psychiatric:         Mood and Affect: Mood normal.         Behavior: Behavior normal.         Thought Content: Thought content normal.          Judgment: Judgment normal.          /69 (BP Location: Right arm, Patient Position: Sitting, Cuff Size: large)   Pulse 67   Temp 98 °F (36.7 °C)   Resp 16   Ht 6' 2\" (1.88 m)   Wt 263 lb (119.3 kg)   SpO2 96%   BMI 33.77 kg/m²  Estimated body mass index is 33.77 kg/m² as calculated from the following:    Height as of this encounter: 6' 2\" (1.88 m).    Weight as of this encounter: 263 lb (119.3 kg).    Medicare Hearing Assessment:   Hearing Screening    Screening Method: Questionnaire  I have a problem hearing over the telephone: No I have trouble following the conversations when two or more people are talking at the same time: No   I have trouble understanding things on the TV: No I have to strain to understand conversations: No   I have to worry about missing the telephone ring or doorbell: No I have trouble hearing conversations in a noisy background such as a crowded room or restaurant: Sometimes   I get confused about where sounds come from: No I misunderstand some words in a sentence and need to ask people to repeat themselves: No   I especially have trouble understanding the speech of women and children: No I have trouble understanding the speaker in a large room such as at a meeting or place of Confucianist: No   Many people I talk to seem to mumble (or don't speak clearly): No People get annoyed because I misunderstand what they say: No   I misunderstand what others are saying and make inappropriate responses: No I avoid social activities because I cannot hear well and fear I will reply improperly: No   Family members and friends have told me they think I may have hearing loss: No             Visual Acuity:   Right Eye Visual Acuity: Corrected Right Eye Chart Acuity: 20/70   Left Eye Visual Acuity: Corrected Left Eye Chart Acuity: 20/50   Both Eyes Visual Acuity: Corrected Both Eyes Chart Acuity: 20/50   Able To Tolerate Visual Acuity: Yes        Assessment & Plan:   Blue Acevedo is a 65  year old male who presents for a Medicare Assessment.     1. Encounter for annual health examination  Physical exam remarkable for overweight status.  Active issues as below.  Health maintenance issues reviewed.  Encouraged patient to improve health habits with better diet and more exercise.  Also needs to quit smoking if at all possible.  Discussed vaccination status but he was not ready to commit to any shots today.  He may think about the pneumonia shot.  Discussed the importance of advanced directives.    2. Essential hypertension with goal blood pressure less than 140/90  Blood pressure is well-controlled.  Continue current treatment.  Work on diet and exercise.  We will check fasting blood work and contact patient with results.  - CBC With Differential With Platelet; Future  - Comp Metabolic Panel (14); Future  - Lipid Panel; Future  - TSH W Reflex To Free T4; Future    3. Tobacco use disorder  Continues to smoke about 1/2 pack of cigarettes a day.  Strongly encouraged to work on smoking cessation if possible.    4. Lumbar disc disease  Ongoing low back pain and leg issues that limit his capacity to do things.  Continue working with physiatry.  Continue current medication.    5. Essential tremor  Still present.  It waxes and wanes.  We had decreased the dose of propranolol previously because of side effects.  We discussed possibly increasing the dose again.  We will not do that and just leave him on the same dose.    6. Abdominal aortic aneurysm (AAA) without rupture, unspecified part  Recent CT a study that showed no change in the thoracic aneurysm.  Also no change in pulmonary nodules.  Patient cites the aneurysm as a reason why he cannot be more physically active.  I encouraged him to discuss this with the cardiologist in detail.  He needs to be physically active.  - Cardio Referral - Internal    7. Pulmonary nodule  Recent CT scan showed no change in size.  No evidence of cancer.    8. Coronary artery  disease involving native coronary artery of native heart, unspecified whether angina present  Asymptomatic.  Continue current treatment.  Follow-up with cardiology doctor as suggested at the most recent visit with the cardiology nurse practitioner.  - Cardio Referral - Internal    9. Dilated aortic root  CT angiogram showed no change.    10. Screening for prostate cancer  Check PSA.  - PSA Total, Screen; Future    11. Hyperlipidemia, unspecified hyperlipidemia type  Check lipid profile.  Adjust dose of medication if needed.  Work on diet and exercise.    Assessment & Plan      The patient indicates understanding of these issues and agrees to the plan.  Reinforced healthy diet, lifestyle, and exercise.      No follow-ups on file.     Tee Palafox MD, 6/25/2025     Supplementary Documentation:   General Health:  In the past six months, have you lost more than 10 pounds without trying?: 2 - No  Has your appetite been poor?: No  Type of Diet: Balanced  How does the patient maintain a good energy level?: Appropriate Exercise  How would you describe your daily physical activity?: Light  How would you describe your current health state?: Fair  How do you maintain positive mental well-being?: Visiting Friends  On a scale of 0 to 10, with 0 being no pain and 10 being severe pain, what is your pain level?: 5 - (Moderate)  In the past six months, have you experienced urine leakage?: 0-No  At any time do you feel concerned for the safety/well-being of yourself and/or your children, in your home or elsewhere?: No  Have you had any immunizations at another office such as Influenza, Hepatitis B, Tetanus, or Pneumococcal?: No    Health Maintenance   Topic Date Due    Pneumococcal Vaccine: 50+ Years (1 of 1 - PCV) Never done    Zoster Vaccines (1 of 2) Never done    COVID-19 Vaccine (1 - 2024-25 season) Never done    Tobacco Cessation Counseling  01/01/2025    Annual Physical  06/17/2025    Influenza Vaccine (Season Ended)  10/01/2025    Colorectal Cancer Screening  10/14/2025    PSA  08/02/2026    Annual Depression Screening  Completed    Fall Risk Screening (Annual)  Completed    Meningococcal B Vaccine  Aged Out

## 2025-08-06 RX ORDER — LOSARTAN POTASSIUM AND HYDROCHLOROTHIAZIDE 25; 100 MG/1; MG/1
1 TABLET ORAL DAILY
Qty: 90 TABLET | Refills: 0 | OUTPATIENT
Start: 2025-08-06

## 2025-08-20 ENCOUNTER — LAB ENCOUNTER (OUTPATIENT)
Dept: LAB | Facility: HOSPITAL | Age: 65
End: 2025-08-20
Attending: INTERNAL MEDICINE

## 2025-08-20 DIAGNOSIS — Z12.5 SCREENING FOR PROSTATE CANCER: ICD-10-CM

## 2025-08-20 DIAGNOSIS — I10 ESSENTIAL HYPERTENSION WITH GOAL BLOOD PRESSURE LESS THAN 140/90: ICD-10-CM

## 2025-08-20 LAB
ALBUMIN SERPL-MCNC: 5.1 G/DL (ref 3.2–4.8)
ALBUMIN/GLOB SERPL: 2.2 (ref 1–2)
ALP LIVER SERPL-CCNC: 61 U/L (ref 45–117)
ALT SERPL-CCNC: 37 U/L (ref 10–49)
ANION GAP SERPL CALC-SCNC: 5 MMOL/L (ref 0–18)
AST SERPL-CCNC: 24 U/L (ref ?–34)
BASOPHILS # BLD AUTO: 0.1 X10(3) UL (ref 0–0.2)
BASOPHILS NFR BLD AUTO: 1.4 %
BILIRUB SERPL-MCNC: 0.7 MG/DL (ref 0.2–1.1)
BUN BLD-MCNC: 12 MG/DL (ref 9–23)
BUN/CREAT SERPL: 11.7 (ref 10–20)
CALCIUM BLD-MCNC: 9.9 MG/DL (ref 8.7–10.4)
CHLORIDE SERPL-SCNC: 101 MMOL/L (ref 98–112)
CHOLEST SERPL-MCNC: 123 MG/DL (ref ?–200)
CO2 SERPL-SCNC: 31 MMOL/L (ref 21–32)
COMPLEXED PSA SERPL-MCNC: 2.46 NG/ML (ref ?–4)
CREAT BLD-MCNC: 1.03 MG/DL (ref 0.7–1.3)
DEPRECATED RDW RBC AUTO: 41.4 FL (ref 35.1–46.3)
EGFRCR SERPLBLD CKD-EPI 2021: 81 ML/MIN/1.73M2 (ref 60–?)
EOSINOPHIL # BLD AUTO: 0.21 X10(3) UL (ref 0–0.7)
EOSINOPHIL NFR BLD AUTO: 3 %
ERYTHROCYTE [DISTWIDTH] IN BLOOD BY AUTOMATED COUNT: 12 % (ref 11–15)
FASTING PATIENT LIPID ANSWER: YES
FASTING STATUS PATIENT QL REPORTED: YES
GLOBULIN PLAS-MCNC: 2.3 G/DL (ref 2–3.5)
GLUCOSE BLD-MCNC: 116 MG/DL (ref 70–99)
HCT VFR BLD AUTO: 40.8 % (ref 39–53)
HDLC SERPL-MCNC: 34 MG/DL (ref 40–59)
HGB BLD-MCNC: 14.4 G/DL (ref 13–17.5)
IMM GRANULOCYTES # BLD AUTO: 0.03 X10(3) UL (ref 0–1)
IMM GRANULOCYTES NFR BLD: 0.4 %
LDLC SERPL CALC-MCNC: 56 MG/DL (ref ?–100)
LYMPHOCYTES # BLD AUTO: 1.37 X10(3) UL (ref 1–4)
LYMPHOCYTES NFR BLD AUTO: 19.7 %
MCH RBC QN AUTO: 32.8 PG (ref 26–34)
MCHC RBC AUTO-ENTMCNC: 35.3 G/DL (ref 31–37)
MCV RBC AUTO: 92.9 FL (ref 80–100)
MONOCYTES # BLD AUTO: 0.74 X10(3) UL (ref 0.1–1)
MONOCYTES NFR BLD AUTO: 10.6 %
NEUTROPHILS # BLD AUTO: 4.51 X10 (3) UL (ref 1.5–7.7)
NEUTROPHILS # BLD AUTO: 4.51 X10(3) UL (ref 1.5–7.7)
NEUTROPHILS NFR BLD AUTO: 64.9 %
NONHDLC SERPL-MCNC: 89 MG/DL (ref ?–130)
OSMOLALITY SERPL CALC.SUM OF ELEC: 285 MOSM/KG (ref 275–295)
PLATELET # BLD AUTO: 187 10(3)UL (ref 150–450)
POTASSIUM SERPL-SCNC: 3.5 MMOL/L (ref 3.5–5.1)
PROT SERPL-MCNC: 7.4 G/DL (ref 5.7–8.2)
RBC # BLD AUTO: 4.39 X10(6)UL (ref 3.8–5.8)
SODIUM SERPL-SCNC: 137 MMOL/L (ref 136–145)
T4 FREE SERPL-MCNC: 0.9 NG/DL (ref 0.8–1.7)
TRIGL SERPL-MCNC: 198 MG/DL (ref 30–149)
TSI SER-ACNC: 7.18 UIU/ML (ref 0.55–4.78)
VLDLC SERPL CALC-MCNC: 29 MG/DL (ref 0–30)
WBC # BLD AUTO: 7 X10(3) UL (ref 4–11)

## 2025-08-20 PROCEDURE — 80053 COMPREHEN METABOLIC PANEL: CPT

## 2025-08-20 PROCEDURE — 84443 ASSAY THYROID STIM HORMONE: CPT

## 2025-08-20 PROCEDURE — 84439 ASSAY OF FREE THYROXINE: CPT

## 2025-08-20 PROCEDURE — 36415 COLL VENOUS BLD VENIPUNCTURE: CPT

## 2025-08-20 PROCEDURE — 85025 COMPLETE CBC W/AUTO DIFF WBC: CPT

## 2025-08-20 PROCEDURE — 80061 LIPID PANEL: CPT

## (undated) DIAGNOSIS — Z12.11 ENCOUNTER FOR SCREENING COLONOSCOPY: Primary | ICD-10-CM

## (undated) NOTE — LETTER
[address] [facility]   Date:   1/27/2022     Name:   Nathaniel Ruelas    YOB: 1960   MRN:   [id number]       WHERE IS YOUR PAIN NOW?   Gabriel the areas on your body where you feel the de

## (undated) NOTE — LETTER
[address]   Date:   8/3/2021     Name:   Srinath Quispe    YOB: 1960   MRN:   [id number]       WHERE IS YOUR PAIN NOW? Gabriel the areas on your body where you feel the described sensations.   Use the appropriate sym

## (undated) NOTE — LETTER
[address] [place] [address]            Anni Ambrocio                        [phone]      10/4/2021      Carrie Ervin MD  [address]           Patient: Gilmer Herrera   YOB: 1960

## (undated) NOTE — MR AVS SNAPSHOT
[facility]  [address] [place] [address], [address] [place] [address]  Nitish Janie               Thank you for choosing us for your health care visit with Brenda Carrasquillo MD.  We are glad to serve you and happy to provide you with this summary of Take 250 mg by mouth daily.                 Where to Get Your Medications      These medications were sent to [facility], [address] [phone], [address] [place] [address], [address] [facility] [id number]     Phone:  [phone]- HOW TO GET STARTED: HOW TO STAY MOTIVATED:   Start activities slowly and build up over time Do what you like   Get your heart pumping – brisk walking, biking, swimming Even 10 minute increments are effective and add up over the week   2 ½ hours per week –

## (undated) NOTE — LETTER
AUTHORIZATION FOR SURGICAL OPERATION OR OTHER PROCEDURE    1.  I hereby authorize Dr. Ashley Little and the [facility] staff assigned to my case to perform the following operation and/or procedure at the [facility]:    ______________________________________ Patient Name:  ______________________________________________________  (please print)       Patient signature:  ___________________________________________________             Relationship to Patient:           []  Parent    Responsible person

## (undated) NOTE — LETTER
October 31, 2018     Mirela RUANO [address] [place] [address]      Dear Mirela Staton:    Below are the results from your recent visit:    Resulted Orders   COMP METABOLIC PANEL (14)   Result Value Ref Range    Glucose 102 (H) 70 - 99 mg/dL Eosinophil Absolute 0.2 0.0 - 0.7 K/UL    Basophil Absolute 0.1 0.0 - 0.2 K/UL   T4, FREE (S)   Result Value Ref Range    Free T4 0.57 (L) 0.58 - 1.64 ng/dL     The blood cell count is normal. There is no evidence of anemia or infection.     The blood suga

## (undated) NOTE — LETTER
[address]   Date:   1/26/2021     Name:   Leo Frias    YOB: 1960   MRN:   [id number]       WHERE IS YOUR PAIN NOW? Gabriel the areas on your body where you feel the described sensations.   Use the appropriate sy

## (undated) NOTE — LETTER
[facility] SURGERY SCHEDULING FORM   [address] [place] [address]   [phone] (scheduling phone) [phone] (scheduling fax)     PATIENT INFORMATION   Patient Name:    Yulia Zurita   :    3/22/1960 Choice    Alexia    Regional/Axillary    Spinal    No Anesthesia     Yes    No   Allergies: Pollen       Completed by:    Niki Grace      Date:    1/26/2018    [facility] will follow its Pre-Admission Assessment and Screening Policy for pre-admission testin

## (undated) NOTE — LETTER
July 7, 2021     Lyndsey Beech K [address] [place] [address]      Dear Lyndsey Beech:    Below are the results from your recent visit:    Please follow up with Cardiologist for irregular beats on 24 hour Holter monitor.    Phone number for any one

## (undated) NOTE — LETTER
[facility] SURGERY SCHEDULING FORM   [address]  [address], [place]   [phone] (scheduling phone) [phone] (scheduling fax)     PATIENT INFORMATION   Last Name:      Eben  Name:    Tamara Wilson []  No Anesthesia   [x]  Yes  []  No or using our own   Allergies: Pollen         Completed by:    Leeanne Hoffmann      Date:    5/3/2018

## (undated) NOTE — LETTER
[address], [facility]   Date:   1/27/2022     Name:   Cuong Cardoso    YOB: 1960   MRN:   [id number]       WHERE IS YOUR PAIN NOW?   Gabriel the areas on your body where you feel the de

## (undated) NOTE — LETTER
November 12, 2019     Mily Sosa K [address] [place] [address]      Dear Mily Sosa:    Below are the results from your recent visit:    The blood cell count is normal. There is no evidence of anemia or infection.      The blood sugar (gluco TSH 4.160 (H) 0.358 - 3.740 mIU/mL   PSA SCREEN   Result Value Ref Range    Prostate Specific Antigen Screen 1.76 <=4.00 ng/mL   CBC W/ DIFFERENTIAL   Result Value Ref Range    WBC 6.5 4.0 - 11.0 x10(3) uL    RBC 4.67 4.30 - 5.70 x10(6)uL    HGB 15.3 13. 0

## (undated) NOTE — LETTER
May 18, 2019     Tamara RUANO [address]      Dear Tamara Wilson:    Below are the results from your recent visit:    Resulted Orders   [address]  (CPT=76770)    Narrative    FINDINGS:   AORTA: There is a dis

## (undated) NOTE — LETTER
April 11, 2018     Mily Sosa K [address]  [address] [place] [address]      Dear Mily Sosa:    Below are the results from your recent visit:    [address]  (CPT=76770)    Narrative    FINDINGS:   AORTA: Multifocal

## (undated) NOTE — LETTER
[address] [facility]    Consent for Coronary CT Angiography    Your doctor has recommended that you have a Coronary CT Angiography procedure.  Coronary CT Angiography is a diagnostic procedure using computed tomography to scan the can range from very minor to very serious leading to a life threatening situation or even death. Please be sure to communicate any allergy you may have to your caregiver immediately.     The information that is obtained during your testing will be treated a

## (undated) NOTE — LETTER
Blue Acevedo,   [address] [facility] [address]     Abel marquez,    This is the [facility] [address], office of Dr. Tee Palafox    Thank you for putting your trust in [facility].  Our goal is to deliver the highest quality healthcare and an exceptional patient experience. Upon reviewing of your medical record shows you are due for the following:       Colonoscopy          Please call [phone] to schedule your appointment or schedule online via [employer].     If you changed to a new provider at another facility, please notify the clinic to update your records.    If you had any recent testing at another facility, please have your results faxed to our office at [phone].           Thank you and have a great day!

## (undated) NOTE — LETTER
12/1/2017          To Whom It May Concern:    Ava Pillai is currently under my medical care. He may work in a sedentary position for 3 weeks. If you require additional information please contact our office.         Sincerely,    Simin Macdonald

## (undated) NOTE — LETTER
WHERE IS YOUR PAIN NOW?  Betsy the areas on your body where you feel the described sensations.  Use the appropriate symbol.  Betsy the areas of radiation.  Include all affected areas.  Just to complete the picture, please draw in the face.     ACHE:  ^ ^ ^   NUMBNESS:  0000   PINS & NEEDLES:  = = = =                              ^ ^ ^                       0000              = = = =                                    ^ ^ ^                       0000            = = = =      BURNING:  XXXX   STABBING: ////                  XXXX                ////                         XXXX          ////     Please betsy the line below indicating your degree of pain right now  with 0 being no pain 10 being the worst pain possible.                                         0             1             2              3             4              5              6              7             8             9             10         Patient Signature:

## (undated) NOTE — LETTER
7/1/2019              Blue RUANO [address] [address]         Dear Kira Bautista,    This letter is to inform you that our office has made several attempts to reach you by phone without success. We were attempting to contact you by phone regarding scheduling a yearly physical examination. Please contact our office at the number listed below as soon as you receive this letter to discuss this issue and to make the necessary changes in our system to your contact information. Thank you for your cooperation.         Sincerely,    Kelsey Patterson PA-C  [address]    [phone]

## (undated) NOTE — LETTER
[address] [facility]    Consent for Coronary CT Angiography    Your doctor has recommended that you have a Coronary CT Angiography procedure.  Coronary CT Angiography is a diagnostic procedure using computed tomography to scan the can range from very minor to very serious leading to a life threatening situation or even death. Please be sure to communicate any allergy you may have to your caregiver immediately.     The information that is obtained during your testing will be treated a

## (undated) NOTE — LETTER
21      Gladys Clemente  :  3/22/1960      To Whom It May Concern: This patient was seen in our office on 21. Work status: He will remain off work for the time being. He should stop work starting today.  I will fill out separate FMLA peterson

## (undated) NOTE — LETTER
[address]   Date:   12/16/2020     Name:   Beryle Citron    YOB: 1960   MRN:   [id number]       WHERE IS YOUR PAIN NOW? Gabriel the areas on your body where you feel the described sensations.   Use the appropriate s